# Patient Record
Sex: FEMALE | Race: ASIAN | NOT HISPANIC OR LATINO
[De-identification: names, ages, dates, MRNs, and addresses within clinical notes are randomized per-mention and may not be internally consistent; named-entity substitution may affect disease eponyms.]

---

## 2023-01-09 PROBLEM — Z00.00 ENCOUNTER FOR PREVENTIVE HEALTH EXAMINATION: Status: ACTIVE | Noted: 2023-01-09

## 2023-01-09 PROBLEM — Z86.39 HISTORY OF HYPERLIPIDEMIA: Status: RESOLVED | Noted: 2023-01-09 | Resolved: 2023-01-09

## 2023-01-09 PROBLEM — M81.0 AGE-RELATED OSTEOPOROSIS WITHOUT CURRENT PATHOLOGICAL FRACTURE: Status: RESOLVED | Noted: 2023-01-09 | Resolved: 2023-01-09

## 2023-01-09 PROBLEM — B18.1: Status: RESOLVED | Noted: 2023-01-09 | Resolved: 2023-01-09

## 2023-01-09 PROBLEM — U07.1 COVID-19: Status: RESOLVED | Noted: 2023-01-09 | Resolved: 2023-01-09

## 2023-01-09 PROBLEM — I71.23 ANEURYSM OF DESCENDING THORACIC AORTA WITHOUT RUPTURE: Status: RESOLVED | Noted: 2023-01-09 | Resolved: 2023-01-09

## 2023-01-09 PROBLEM — F51.01 PRIMARY INSOMNIA: Status: RESOLVED | Noted: 2023-01-09 | Resolved: 2023-01-09

## 2023-01-09 PROBLEM — I10 HYPERTENSION, BENIGN: Status: RESOLVED | Noted: 2023-01-09 | Resolved: 2023-01-09

## 2023-01-09 RX ORDER — TENOFOVIR ALAFENAMIDE 25 MG/1
25 TABLET ORAL
Refills: 0 | Status: ACTIVE | COMMUNITY

## 2023-01-09 RX ORDER — ASPIRIN 81 MG
81 TABLET, DELAYED RELEASE (ENTERIC COATED) ORAL
Refills: 0 | Status: ACTIVE | COMMUNITY

## 2023-01-09 RX ORDER — AMLODIPINE BESYLATE 5 MG/1
5 TABLET ORAL
Refills: 0 | Status: ACTIVE | COMMUNITY

## 2023-01-09 RX ORDER — ESOMEPRAZOLE MAGNESIUM 40 MG/1
40 CAPSULE, DELAYED RELEASE ORAL
Refills: 0 | Status: ACTIVE | COMMUNITY

## 2023-01-09 RX ORDER — ZOLPIDEM TARTRATE 10 MG/1
10 TABLET ORAL
Refills: 0 | Status: ACTIVE | COMMUNITY

## 2023-01-09 RX ORDER — ATORVASTATIN CALCIUM 20 MG/1
20 TABLET, FILM COATED ORAL
Refills: 0 | Status: ACTIVE | COMMUNITY

## 2023-01-09 RX ORDER — DENOSUMAB 60 MG/ML
60 INJECTION SUBCUTANEOUS
Refills: 0 | Status: ACTIVE | COMMUNITY

## 2023-01-13 ENCOUNTER — APPOINTMENT (OUTPATIENT)
Dept: THORACIC SURGERY | Facility: CLINIC | Age: 73
End: 2023-01-13
Payer: MEDICARE

## 2023-01-13 ENCOUNTER — OUTPATIENT (OUTPATIENT)
Dept: OUTPATIENT SERVICES | Facility: HOSPITAL | Age: 73
LOS: 1 days | End: 2023-01-13
Payer: MEDICARE

## 2023-01-13 VITALS
HEART RATE: 71 BPM | DIASTOLIC BLOOD PRESSURE: 78 MMHG | TEMPERATURE: 97.4 F | OXYGEN SATURATION: 97 % | SYSTOLIC BLOOD PRESSURE: 149 MMHG | BODY MASS INDEX: 20.91 KG/M2 | HEIGHT: 63 IN | RESPIRATION RATE: 18 BRPM | WEIGHT: 118 LBS

## 2023-01-13 DIAGNOSIS — R91.1 SOLITARY PULMONARY NODULE: ICD-10-CM

## 2023-01-13 DIAGNOSIS — I71.23 ANEURYSM OF THE DESCENDING THORACIC AORTA, WITHOUT RUPTURE: ICD-10-CM

## 2023-01-13 DIAGNOSIS — U07.1 COVID-19: ICD-10-CM

## 2023-01-13 DIAGNOSIS — Z86.39 PERSONAL HISTORY OF OTHER ENDOCRINE, NUTRITIONAL AND METABOLIC DISEASE: ICD-10-CM

## 2023-01-13 DIAGNOSIS — M81.0 AGE-RELATED OSTEOPOROSIS W/OUT CURRENT PATHOLOGICAL FRACTURE: ICD-10-CM

## 2023-01-13 DIAGNOSIS — Z01.818 ENCOUNTER FOR OTHER PREPROCEDURAL EXAMINATION: ICD-10-CM

## 2023-01-13 DIAGNOSIS — H91.90 UNSPECIFIED HEARING LOSS, UNSPECIFIED EAR: ICD-10-CM

## 2023-01-13 DIAGNOSIS — B18.1 CHRONIC VIRAL HEPATITIS B W/OUT DELTA-AGENT: ICD-10-CM

## 2023-01-13 DIAGNOSIS — I10 ESSENTIAL (PRIMARY) HYPERTENSION: ICD-10-CM

## 2023-01-13 DIAGNOSIS — Z86.59 PERSONAL HISTORY OF OTHER MENTAL AND BEHAVIORAL DISORDERS: ICD-10-CM

## 2023-01-13 DIAGNOSIS — F51.01 PRIMARY INSOMNIA: ICD-10-CM

## 2023-01-13 DIAGNOSIS — K74.60 CHRONIC VIRAL HEPATITIS B W/OUT DELTA-AGENT: ICD-10-CM

## 2023-01-13 LAB
ALBUMIN SERPL ELPH-MCNC: 4.8 G/DL — SIGNIFICANT CHANGE UP (ref 3.3–5)
ALP SERPL-CCNC: 62 U/L — SIGNIFICANT CHANGE UP (ref 40–120)
ALT FLD-CCNC: 37 U/L — SIGNIFICANT CHANGE UP (ref 10–45)
ANION GAP SERPL CALC-SCNC: 11 MMOL/L — SIGNIFICANT CHANGE UP (ref 5–17)
APPEARANCE UR: CLEAR — SIGNIFICANT CHANGE UP
APTT BLD: 28.6 SEC — SIGNIFICANT CHANGE UP (ref 27.5–35.5)
AST SERPL-CCNC: 37 U/L — SIGNIFICANT CHANGE UP (ref 10–40)
BACTERIA # UR AUTO: SIGNIFICANT CHANGE UP /HPF
BASOPHILS # BLD AUTO: 0.04 K/UL — SIGNIFICANT CHANGE UP (ref 0–0.2)
BASOPHILS NFR BLD AUTO: 0.6 % — SIGNIFICANT CHANGE UP (ref 0–2)
BILIRUB SERPL-MCNC: 0.8 MG/DL — SIGNIFICANT CHANGE UP (ref 0.2–1.2)
BILIRUB UR-MCNC: NEGATIVE — SIGNIFICANT CHANGE UP
BUN SERPL-MCNC: 14 MG/DL — SIGNIFICANT CHANGE UP (ref 7–23)
CALCIUM SERPL-MCNC: 9.3 MG/DL — SIGNIFICANT CHANGE UP (ref 8.4–10.5)
CHLORIDE SERPL-SCNC: 101 MMOL/L — SIGNIFICANT CHANGE UP (ref 96–108)
CO2 SERPL-SCNC: 31 MMOL/L — SIGNIFICANT CHANGE UP (ref 22–31)
COLOR SPEC: YELLOW — SIGNIFICANT CHANGE UP
CREAT SERPL-MCNC: 0.64 MG/DL — SIGNIFICANT CHANGE UP (ref 0.5–1.3)
DIFF PNL FLD: ABNORMAL
EGFR: 94 ML/MIN/1.73M2 — SIGNIFICANT CHANGE UP
EOSINOPHIL # BLD AUTO: 0.03 K/UL — SIGNIFICANT CHANGE UP (ref 0–0.5)
EOSINOPHIL NFR BLD AUTO: 0.4 % — SIGNIFICANT CHANGE UP (ref 0–6)
EPI CELLS # UR: SIGNIFICANT CHANGE UP /HPF (ref 0–5)
GLUCOSE SERPL-MCNC: 107 MG/DL — HIGH (ref 70–99)
GLUCOSE UR QL: NEGATIVE — SIGNIFICANT CHANGE UP
HCT VFR BLD CALC: 43.1 % — SIGNIFICANT CHANGE UP (ref 34.5–45)
HGB BLD-MCNC: 14.4 G/DL — SIGNIFICANT CHANGE UP (ref 11.5–15.5)
IMM GRANULOCYTES NFR BLD AUTO: 0.3 % — SIGNIFICANT CHANGE UP (ref 0–0.9)
INR BLD: 0.93 — SIGNIFICANT CHANGE UP (ref 0.88–1.16)
KETONES UR-MCNC: NEGATIVE — SIGNIFICANT CHANGE UP
LEUKOCYTE ESTERASE UR-ACNC: NEGATIVE — SIGNIFICANT CHANGE UP
LYMPHOCYTES # BLD AUTO: 3.06 K/UL — SIGNIFICANT CHANGE UP (ref 1–3.3)
LYMPHOCYTES # BLD AUTO: 43.3 % — SIGNIFICANT CHANGE UP (ref 13–44)
MCHC RBC-ENTMCNC: 31.6 PG — SIGNIFICANT CHANGE UP (ref 27–34)
MCHC RBC-ENTMCNC: 33.4 GM/DL — SIGNIFICANT CHANGE UP (ref 32–36)
MCV RBC AUTO: 94.7 FL — SIGNIFICANT CHANGE UP (ref 80–100)
MONOCYTES # BLD AUTO: 0.59 K/UL — SIGNIFICANT CHANGE UP (ref 0–0.9)
MONOCYTES NFR BLD AUTO: 8.4 % — SIGNIFICANT CHANGE UP (ref 2–14)
NEUTROPHILS # BLD AUTO: 3.32 K/UL — SIGNIFICANT CHANGE UP (ref 1.8–7.4)
NEUTROPHILS NFR BLD AUTO: 47 % — SIGNIFICANT CHANGE UP (ref 43–77)
NITRITE UR-MCNC: NEGATIVE — SIGNIFICANT CHANGE UP
NRBC # BLD: 0 /100 WBCS — SIGNIFICANT CHANGE UP (ref 0–0)
PH UR: 7 — SIGNIFICANT CHANGE UP (ref 5–8)
PLATELET # BLD AUTO: 208 K/UL — SIGNIFICANT CHANGE UP (ref 150–400)
POTASSIUM SERPL-MCNC: 3.4 MMOL/L — LOW (ref 3.5–5.3)
POTASSIUM SERPL-SCNC: 3.4 MMOL/L — LOW (ref 3.5–5.3)
PROT SERPL-MCNC: 8.2 G/DL — SIGNIFICANT CHANGE UP (ref 6–8.3)
PROT UR-MCNC: NEGATIVE MG/DL — SIGNIFICANT CHANGE UP
PROTHROM AB SERPL-ACNC: 11 SEC — SIGNIFICANT CHANGE UP (ref 10.5–13.4)
RBC # BLD: 4.55 M/UL — SIGNIFICANT CHANGE UP (ref 3.8–5.2)
RBC # FLD: 12.9 % — SIGNIFICANT CHANGE UP (ref 10.3–14.5)
RBC CASTS # UR COMP ASSIST: < 5 /HPF — SIGNIFICANT CHANGE UP
SODIUM SERPL-SCNC: 143 MMOL/L — SIGNIFICANT CHANGE UP (ref 135–145)
SP GR SPEC: 1.01 — SIGNIFICANT CHANGE UP (ref 1–1.03)
UROBILINOGEN FLD QL: 0.2 E.U./DL — SIGNIFICANT CHANGE UP
WBC # BLD: 7.06 K/UL — SIGNIFICANT CHANGE UP (ref 3.8–10.5)
WBC # FLD AUTO: 7.06 K/UL — SIGNIFICANT CHANGE UP (ref 3.8–10.5)
WBC UR QL: < 5 /HPF — SIGNIFICANT CHANGE UP

## 2023-01-13 PROCEDURE — 85025 COMPLETE CBC W/AUTO DIFF WBC: CPT

## 2023-01-13 PROCEDURE — 85610 PROTHROMBIN TIME: CPT

## 2023-01-13 PROCEDURE — 85730 THROMBOPLASTIN TIME PARTIAL: CPT

## 2023-01-13 PROCEDURE — 36415 COLL VENOUS BLD VENIPUNCTURE: CPT

## 2023-01-13 PROCEDURE — 80053 COMPREHEN METABOLIC PANEL: CPT

## 2023-01-13 PROCEDURE — 86850 RBC ANTIBODY SCREEN: CPT

## 2023-01-13 PROCEDURE — 86901 BLOOD TYPING SEROLOGIC RH(D): CPT

## 2023-01-13 PROCEDURE — 99205 OFFICE O/P NEW HI 60 MIN: CPT

## 2023-01-13 PROCEDURE — 81001 URINALYSIS AUTO W/SCOPE: CPT

## 2023-01-13 PROCEDURE — 86900 BLOOD TYPING SEROLOGIC ABO: CPT

## 2023-01-18 PROBLEM — Z86.59 HISTORY OF DEPRESSION: Status: RESOLVED | Noted: 2023-01-18 | Resolved: 2023-01-18

## 2023-01-18 PROBLEM — Z01.818 PREOP EXAMINATION: Status: ACTIVE | Noted: 2023-01-09

## 2023-01-18 PROBLEM — R91.1 PULMONARY NODULE: Status: ACTIVE | Noted: 2023-01-09

## 2023-01-18 PROBLEM — H91.90 HARD OF HEARING: Status: ACTIVE | Noted: 2023-01-18

## 2023-01-18 NOTE — PHYSICAL EXAM
[General Appearance - Alert] : alert [General Appearance - In No Acute Distress] : in no acute distress [Neck Appearance] : the appearance of the neck was normal [Neck Cervical Mass (___cm)] : no neck mass was observed [Jugular Venous Distention Increased] : there was no jugular-venous distention [Thyroid Diffuse Enlargement] : the thyroid was not enlarged [Thyroid Nodule] : there were no palpable thyroid nodules [Auscultation Breath Sounds / Voice Sounds] : lungs were clear to auscultation bilaterally [Heart Rate And Rhythm] : heart rate was normal and rhythm regular [Heart Sounds] : normal S1 and S2 [Heart Sounds Gallop] : no gallops [Murmurs] : no murmurs [Heart Sounds Pericardial Friction Rub] : no pericardial rub [Abnormal Walk] : normal gait [Nail Clubbing] : no clubbing  or cyanosis of the fingernails [Musculoskeletal - Swelling] : no joint swelling seen [Motor Tone] : muscle strength and tone were normal [Skin Color & Pigmentation] : normal skin color and pigmentation [Skin Turgor] : normal skin turgor [] : no rash [Deep Tendon Reflexes (DTR)] : deep tendon reflexes were 2+ and symmetric [Sensation] : the sensory exam was normal to light touch and pinprick [No Focal Deficits] : no focal deficits [Impaired Insight] : insight and judgment were intact

## 2023-01-24 ENCOUNTER — OUTPATIENT (OUTPATIENT)
Dept: OUTPATIENT SERVICES | Facility: HOSPITAL | Age: 73
LOS: 1 days | End: 2023-01-24
Payer: MEDICARE

## 2023-01-24 DIAGNOSIS — R91.1 SOLITARY PULMONARY NODULE: ICD-10-CM

## 2023-01-24 DIAGNOSIS — Z01.818 ENCOUNTER FOR OTHER PREPROCEDURAL EXAMINATION: ICD-10-CM

## 2023-01-24 PROCEDURE — 94727 GAS DIL/WSHOT DETER LNG VOL: CPT | Mod: 26

## 2023-01-24 PROCEDURE — 94729 DIFFUSING CAPACITY: CPT

## 2023-01-24 PROCEDURE — 94010 BREATHING CAPACITY TEST: CPT | Mod: 26

## 2023-01-24 PROCEDURE — 94060 EVALUATION OF WHEEZING: CPT

## 2023-01-24 PROCEDURE — 94726 PLETHYSMOGRAPHY LUNG VOLUMES: CPT

## 2023-01-24 PROCEDURE — 94760 N-INVAS EAR/PLS OXIMETRY 1: CPT

## 2023-01-24 PROCEDURE — 94729 DIFFUSING CAPACITY: CPT | Mod: 26

## 2023-01-25 NOTE — H&P ADULT - NSHPLABSRESULTS_GEN_ALL_CORE
PFT on 01/24/23  FEV1 = 1.65, 85% of predicted value, FVC = 2.10, 89% of predicted value, PEF = 5.26, 95% of predicted value and DLCO = 16.93, 90% of predicted value.    PET on 01/18/23  - no significant FDG uptake a/w a persistent RLL subsolid, largely ground-glass area.   - No FDG-avid diseae in the field of view.  - Scattered ascending colonic diverticulosis    CT chest on 01/04/23  - Right lower lobe (14 mm) subsolid nodule. Adenocarcinoma spectrum lesions should be considered.   - Right lower lobe 3 mm pleural-based nodule Stable.  - Right upper lobe 2 mm mucous plugging   - Attended descending thoracic aorta (4.1 cm); unchanged.    CT chest on 07/18/22  - Mild biapical pleuroparenchymal scarring.   -1.4 x 0.9 cm peripheral right lower lobe subsolid, largely ground-glass opacity.   - 0.3 cm subpleural right lower lobe nodule No pulmonary consolidation or mass.

## 2023-01-25 NOTE — H&P ADULT - ASSESSMENT
IVELISSE MELGOZA is a 72 year old F, Cantonese speaking, 2nd hand smoker, with PMHx of HTN, HLD, HepB, Osteoporosis, depression, who is referred by Cherie Israel for an initial evaluation of a 1.4cm sub-solid nodule in right lower lobe CT chest.     Patient reports progressively worse shortness of breath since her COVID infection in Feb 2021. She otherwise denies unintentional weight loss, fatigue, headache, anorexia, chest pain, or hemoptysis.     CT chest on 01/04/23 and 07/18/2022 showed a persist 1.4 cm sub-solid nodule in Right lower lobe, which is not hypermetabolic active on following PET scan.  Adenocarcinoma spectrum lesions should be considered. Pt deems a surgical candidate.      I suggest surgical resection of the nodule for diagnosis and treatment. The patient was counseled on the risks, benefits and alternatives of proceeding with RVATS, RA, RLL wedge resection, possible lobectomy and MLND. Specifically, the risk of bleeding, need for a blood transfusion, DVT, pulmonary embolism, pneumonia, postoperative respiratory insufficiency, postoperative ventilator support, as well as prolonged air leak, need for chest drainage, dysrhythmia, myocardial infarction, postoperative pain syndrome, need for adjuvant therapy, risk of recurrence, need for surveillance, conversion to an open procedure, as well as mortality was discussed with the patient who understands and agrees to the above.

## 2023-01-25 NOTE — H&P ADULT - HISTORY OF PRESENT ILLNESS
IVELISSE MELGOZA is a 72 year old F, Cantonese speaking, 2nd hand smoker, with PMHx of HTN, HLD, HepB, Osteoporosis, depression, who is referred by Cherie Israel for an initial evaluation of a 1.4cm sub-solid nodule in right lower lobe CT chest.     Patient reports progressively worse shortness of breath since her COVID infection in Feb 2021. She otherwise denies unintentional weight loss, fatigue, headache, anorexia, chest pain, or hemoptysis.    IVELISSE MELGOZA is a 72 year old F, Cantonese speaking, 2nd hand smoker, with PMHx of HTN, HLD, HepB, Osteoporosis, depression, who is referred by Cherie Israel for an initial evaluation of a 1.4cm sub-solid nodule in right lower lobe CT chest.     Patient reports progressively worse shortness of breath since her COVID infection in Feb 2021. She otherwise denies unintentional weight loss, fatigue, headache, anorexia, chest pain, or hemoptysis.     Patient seen in same day holding area; Reports no changes to PMHx or medications since last seen by our team. Denies acute or current SOB, chest pain, palpitation, N/V/D, fever/chills, recent illness, or any other concerning symptoms.  Admit under Dr. Narvaez via same day surgery. Consent signed, placed on chart.  Risks/benefits reviewed, patient understands and agrees. T&S ordered and blood products placed on hold for OR.  To 9  post-op.

## 2023-01-25 NOTE — H&P ADULT - NSICDXPASTMEDICALHX_GEN_ALL_CORE_FT
PAST MEDICAL HISTORY:  Anxiety with depression     COVID-19 virus infection     Hepatitis B infection     Hyperlipidemia     Hypertension     Osteoporosis

## 2023-01-25 NOTE — H&P ADULT - NSHPPHYSICALEXAM_GEN_ALL_CORE
weight = 118lbs on 01/13/23    Constitutional: alert and in no acute distress.   Neck: the appearance of the neck was normal, no neck mass was observed, the thyroid was not enlarged and there were no palpable thyroid nodules . there was no jugular-venous distention.   Pulmonary: no respiratory distress and lungs were clear to auscultation bilaterally.   Heart: heart rate was normal and rhythm regular, normal S1 and S2, no gallops, no murmurs and no pericardial rub.   Musculoskeletal: normal gait, no clubbing or cyanosis of the fingernails, no joint swelling seen and muscle strength and tone were normal.   Skin: normal skin color and pigmentation, normal skin turgor and no rash.   Neurological: deep tendon reflexes were 2+ and symmetric, the sensory exam was normal to light touch and pinprick and no focal deficits.   Psychiatric: insight and judgment were intact.

## 2023-01-25 NOTE — H&P ADULT - NSHPREVIEWOFSYSTEMS_GEN_ALL_CORE
ENT:. heard of hearing bilateral.   Respiratory: as noted in HPI.   Psychiatric: depression . insomnia.   Constitutional, Eyes, Cardiovascular, Gastrointestinal, Musculoskeletal, Integumentary, Neurological and Endocrine are otherwise negative.

## 2023-01-26 ENCOUNTER — TRANSCRIPTION ENCOUNTER (OUTPATIENT)
Age: 73
End: 2023-01-26

## 2023-01-26 VITALS
HEIGHT: 63 IN | RESPIRATION RATE: 16 BRPM | HEART RATE: 73 BPM | TEMPERATURE: 97 F | DIASTOLIC BLOOD PRESSURE: 85 MMHG | WEIGHT: 114.64 LBS | SYSTOLIC BLOOD PRESSURE: 148 MMHG | OXYGEN SATURATION: 100 %

## 2023-01-26 RX ORDER — ASPIRIN/CALCIUM CARB/MAGNESIUM 324 MG
1 TABLET ORAL
Qty: 0 | Refills: 0 | DISCHARGE

## 2023-01-26 NOTE — PATIENT PROFILE ADULT - FALL HARM RISK - UNIVERSAL INTERVENTIONS
Bed in lowest position, wheels locked, appropriate side rails in place/Call bell, personal items and telephone in reach/Instruct patient to call for assistance before getting out of bed or chair/Non-slip footwear when patient is out of bed/[place] to call system/Physically safe environment - no spills, clutter or unnecessary equipment/Purposeful Proactive Rounding/Room/bathroom lighting operational, light cord in reach

## 2023-01-27 ENCOUNTER — NON-APPOINTMENT (OUTPATIENT)
Age: 73
End: 2023-01-27

## 2023-01-27 ENCOUNTER — RESULT REVIEW (OUTPATIENT)
Age: 73
End: 2023-01-27

## 2023-01-27 ENCOUNTER — TRANSCRIPTION ENCOUNTER (OUTPATIENT)
Age: 73
End: 2023-01-27

## 2023-01-27 ENCOUNTER — INPATIENT (INPATIENT)
Facility: HOSPITAL | Age: 73
LOS: 1 days | Discharge: HOME | DRG: 164 | End: 2023-01-29
Attending: THORACIC SURGERY (CARDIOTHORACIC VASCULAR SURGERY) | Admitting: THORACIC SURGERY (CARDIOTHORACIC VASCULAR SURGERY)
Payer: MEDICARE

## 2023-01-27 ENCOUNTER — APPOINTMENT (OUTPATIENT)
Dept: THORACIC SURGERY | Facility: HOSPITAL | Age: 73
End: 2023-01-27

## 2023-01-27 DIAGNOSIS — I10 ESSENTIAL (PRIMARY) HYPERTENSION: ICD-10-CM

## 2023-01-27 DIAGNOSIS — R91.1 SOLITARY PULMONARY NODULE: ICD-10-CM
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
## 2023-01-27 DIAGNOSIS — B19.10 UNSPECIFIED VIRAL HEPATITIS B WITHOUT HEPATIC COMA: ICD-10-CM

## 2023-01-27 DIAGNOSIS — H91.93 UNSPECIFIED HEARING LOSS, BILATERAL: ICD-10-CM

## 2023-01-27 DIAGNOSIS — Z79.620 LONG TERM (CURRENT) USE OF IMMUNOSUPPRESSIVE BIOLOGIC: ICD-10-CM
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
## 2023-01-27 DIAGNOSIS — M81.0 AGE-RELATED OSTEOPOROSIS WITHOUT CURRENT PATHOLOGICAL FRACTURE: ICD-10-CM

## 2023-01-27 DIAGNOSIS — Z79.899 OTHER LONG TERM (CURRENT) DRUG THERAPY: ICD-10-CM

## 2023-01-27 DIAGNOSIS — C34.31 MALIGNANT NEOPLASM OF LOWER LOBE, RIGHT BRONCHUS OR LUNG: ICD-10-CM

## 2023-01-27 DIAGNOSIS — H26.9 UNSPECIFIED CATARACT: Chronic | ICD-10-CM

## 2023-01-27 DIAGNOSIS — Z77.22 CONTACT WITH AND (SUSPECTED) EXPOSURE TO ENVIRONMENTAL TOBACCO SMOKE (ACUTE) (CHRONIC): ICD-10-CM

## 2023-01-27 DIAGNOSIS — F41.8 OTHER SPECIFIED ANXIETY DISORDERS: ICD-10-CM

## 2023-01-27 DIAGNOSIS — J95.811 POSTPROCEDURAL PNEUMOTHORAX: ICD-10-CM

## 2023-01-27 DIAGNOSIS — Z86.16 PERSONAL HISTORY OF COVID-19: ICD-10-CM

## 2023-01-27 DIAGNOSIS — Z90.721 ACQUIRED ABSENCE OF OVARIES, UNILATERAL: Chronic | ICD-10-CM

## 2023-01-27 DIAGNOSIS — E78.5 HYPERLIPIDEMIA, UNSPECIFIED: ICD-10-CM

## 2023-01-27 LAB
ANION GAP SERPL CALC-SCNC: 13 MMOL/L — SIGNIFICANT CHANGE UP (ref 5–17)
BASOPHILS # BLD AUTO: 0.04 K/UL — SIGNIFICANT CHANGE UP (ref 0–0.2)
BASOPHILS NFR BLD AUTO: 0.5 % — SIGNIFICANT CHANGE UP (ref 0–2)
BLD GP AB SCN SERPL QL: NEGATIVE — SIGNIFICANT CHANGE UP
BUN SERPL-MCNC: 11 MG/DL — SIGNIFICANT CHANGE UP (ref 7–23)
CALCIUM SERPL-MCNC: 9.1 MG/DL — SIGNIFICANT CHANGE UP (ref 8.4–10.5)
CHLORIDE SERPL-SCNC: 103 MMOL/L — SIGNIFICANT CHANGE UP (ref 96–108)
CO2 SERPL-SCNC: 28 MMOL/L — SIGNIFICANT CHANGE UP (ref 22–31)
CREAT SERPL-MCNC: 0.61 MG/DL — SIGNIFICANT CHANGE UP (ref 0.5–1.3)
EGFR: 96 ML/MIN/1.73M2 — SIGNIFICANT CHANGE UP
EOSINOPHIL # BLD AUTO: 0.04 K/UL — SIGNIFICANT CHANGE UP (ref 0–0.5)
EOSINOPHIL NFR BLD AUTO: 0.5 % — SIGNIFICANT CHANGE UP (ref 0–6)
GLUCOSE SERPL-MCNC: 149 MG/DL — HIGH (ref 70–99)
GRAM STN FLD: SIGNIFICANT CHANGE UP
HCT VFR BLD CALC: 42.3 % — SIGNIFICANT CHANGE UP (ref 34.5–45)
HGB BLD-MCNC: 14 G/DL — SIGNIFICANT CHANGE UP (ref 11.5–15.5)
IMM GRANULOCYTES NFR BLD AUTO: 0.4 % — SIGNIFICANT CHANGE UP (ref 0–0.9)
LYMPHOCYTES # BLD AUTO: 1.51 K/UL — SIGNIFICANT CHANGE UP (ref 1–3.3)
LYMPHOCYTES # BLD AUTO: 20.1 % — SIGNIFICANT CHANGE UP (ref 13–44)
MCHC RBC-ENTMCNC: 31.5 PG — SIGNIFICANT CHANGE UP (ref 27–34)
MCHC RBC-ENTMCNC: 33.1 GM/DL — SIGNIFICANT CHANGE UP (ref 32–36)
MCV RBC AUTO: 95.1 FL — SIGNIFICANT CHANGE UP (ref 80–100)
MONOCYTES # BLD AUTO: 0.16 K/UL — SIGNIFICANT CHANGE UP (ref 0–0.9)
MONOCYTES NFR BLD AUTO: 2.1 % — SIGNIFICANT CHANGE UP (ref 2–14)
NEUTROPHILS # BLD AUTO: 5.75 K/UL — SIGNIFICANT CHANGE UP (ref 1.8–7.4)
NEUTROPHILS NFR BLD AUTO: 76.4 % — SIGNIFICANT CHANGE UP (ref 43–77)
NRBC # BLD: 0 /100 WBCS — SIGNIFICANT CHANGE UP (ref 0–0)
PLATELET # BLD AUTO: 171 K/UL — SIGNIFICANT CHANGE UP (ref 150–400)
POTASSIUM SERPL-MCNC: 3.5 MMOL/L — SIGNIFICANT CHANGE UP (ref 3.5–5.3)
POTASSIUM SERPL-SCNC: 3.5 MMOL/L — SIGNIFICANT CHANGE UP (ref 3.5–5.3)
RBC # BLD: 4.45 M/UL — SIGNIFICANT CHANGE UP (ref 3.8–5.2)
RBC # FLD: 12.7 % — SIGNIFICANT CHANGE UP (ref 10.3–14.5)
RH IG SCN BLD-IMP: POSITIVE — SIGNIFICANT CHANGE UP
SODIUM SERPL-SCNC: 144 MMOL/L — SIGNIFICANT CHANGE UP (ref 135–145)
SPECIMEN SOURCE: SIGNIFICANT CHANGE UP
WBC # BLD: 7.53 K/UL — SIGNIFICANT CHANGE UP (ref 3.8–10.5)
WBC # FLD AUTO: 7.53 K/UL — SIGNIFICANT CHANGE UP (ref 3.8–10.5)

## 2023-01-27 PROCEDURE — 88307 TISSUE EXAM BY PATHOLOGIST: CPT | Mod: 26

## 2023-01-27 PROCEDURE — 31622 DX BRONCHOSCOPE/WASH: CPT

## 2023-01-27 PROCEDURE — 71045 X-RAY EXAM CHEST 1 VIEW: CPT | Mod: 26

## 2023-01-27 PROCEDURE — 32674 THORACOSCOPY LYMPH NODE EXC: CPT

## 2023-01-27 PROCEDURE — 32666 THORACOSCOPY W/WEDGE RESECT: CPT

## 2023-01-27 PROCEDURE — S2900 ROBOTIC SURGICAL SYSTEM: CPT | Mod: NC

## 2023-01-27 PROCEDURE — 32667 THORACOSCOPY W/W RESECT ADDL: CPT

## 2023-01-27 PROCEDURE — 88331 PATH CONSLTJ SURG 1 BLK 1SPC: CPT | Mod: 26

## 2023-01-27 PROCEDURE — 88305 TISSUE EXAM BY PATHOLOGIST: CPT | Mod: 26

## 2023-01-27 PROCEDURE — 88313 SPECIAL STAINS GROUP 2: CPT | Mod: 26

## 2023-01-27 DEVICE — SURGICEL 4 X 8": Type: IMPLANTABLE DEVICE | Status: FUNCTIONAL

## 2023-01-27 DEVICE — STAPLER COVIDIEN TRI-STAPLE 45MM BLACK RELOAD: Type: IMPLANTABLE DEVICE | Status: FUNCTIONAL

## 2023-01-27 DEVICE — CHEST DRAIN THORACIC PVC 28FR STRAIGHT: Type: IMPLANTABLE DEVICE | Status: FUNCTIONAL

## 2023-01-27 DEVICE — STAPLER COVIDIEN TRI-STAPLE 45MM PURPLE RELOAD: Type: IMPLANTABLE DEVICE | Status: FUNCTIONAL

## 2023-01-27 DEVICE — LIGATING CLIPS WECK HORIZON SMALL-WIDE (RED) 6: Type: IMPLANTABLE DEVICE | Status: FUNCTIONAL

## 2023-01-27 DEVICE — LIGATING CLIPS WECK HEMOLOK POLYMER MEDIUM-LARGE (GREEN) 6: Type: IMPLANTABLE DEVICE | Status: FUNCTIONAL

## 2023-01-27 DEVICE — STAPLER COVIDIEN TRI-STAPLE 60MM BLACK RELOAD: Type: IMPLANTABLE DEVICE | Status: FUNCTIONAL

## 2023-01-27 DEVICE — STAPLER COVIDIEN TRI-STAPLE 60MM PURPLE RELOAD: Type: IMPLANTABLE DEVICE | Status: FUNCTIONAL

## 2023-01-27 RX ORDER — ACETAMINOPHEN 500 MG
650 TABLET ORAL ONCE
Refills: 0 | Status: COMPLETED | OUTPATIENT
Start: 2023-01-27 | End: 2023-01-27

## 2023-01-27 RX ORDER — ZOLPIDEM TARTRATE 10 MG/1
1 TABLET ORAL
Qty: 0 | Refills: 0 | DISCHARGE

## 2023-01-27 RX ORDER — AMLODIPINE BESYLATE 2.5 MG/1
1 TABLET ORAL
Qty: 0 | Refills: 0 | DISCHARGE

## 2023-01-27 RX ORDER — ACETAMINOPHEN 500 MG
1000 TABLET ORAL ONCE
Refills: 0 | Status: COMPLETED | OUTPATIENT
Start: 2023-01-27 | End: 2023-01-27

## 2023-01-27 RX ORDER — IPRATROPIUM BROMIDE 0.2 MG/ML
500 SOLUTION, NON-ORAL INHALATION EVERY 6 HOURS
Refills: 0 | Status: DISCONTINUED | OUTPATIENT
Start: 2023-01-27 | End: 2023-01-29

## 2023-01-27 RX ORDER — ACETAMINOPHEN 500 MG
975 TABLET ORAL EVERY 6 HOURS
Refills: 0 | Status: DISCONTINUED | OUTPATIENT
Start: 2023-01-27 | End: 2023-01-29

## 2023-01-27 RX ORDER — ATORVASTATIN CALCIUM 80 MG/1
1 TABLET, FILM COATED ORAL
Qty: 0 | Refills: 0 | DISCHARGE

## 2023-01-27 RX ORDER — ENOXAPARIN SODIUM 100 MG/ML
40 INJECTION SUBCUTANEOUS EVERY 24 HOURS
Refills: 0 | Status: DISCONTINUED | OUTPATIENT
Start: 2023-01-28 | End: 2023-01-29

## 2023-01-27 RX ORDER — TENOFOVIR DISOPROXIL FUMARATE 300 MG/1
1 TABLET, FILM COATED ORAL
Qty: 0 | Refills: 0 | DISCHARGE

## 2023-01-27 RX ORDER — TENOFOVIR DISOPROXIL FUMARATE 300 MG/1
25 TABLET, FILM COATED ORAL DAILY
Refills: 0 | Status: DISCONTINUED | OUTPATIENT
Start: 2023-01-27 | End: 2023-01-29

## 2023-01-27 RX ORDER — ATORVASTATIN CALCIUM 80 MG/1
20 TABLET, FILM COATED ORAL AT BEDTIME
Refills: 0 | Status: DISCONTINUED | OUTPATIENT
Start: 2023-01-27 | End: 2023-01-29

## 2023-01-27 RX ORDER — HEPARIN SODIUM 5000 [USP'U]/ML
5000 INJECTION INTRAVENOUS; SUBCUTANEOUS ONCE
Refills: 0 | Status: COMPLETED | OUTPATIENT
Start: 2023-01-27 | End: 2023-01-27

## 2023-01-27 RX ORDER — DENOSUMAB 60 MG/ML
60 INJECTION SUBCUTANEOUS
Qty: 0 | Refills: 0 | DISCHARGE

## 2023-01-27 RX ORDER — SODIUM CHLORIDE 9 MG/ML
1000 INJECTION, SOLUTION INTRAVENOUS
Refills: 0 | Status: DISCONTINUED | OUTPATIENT
Start: 2023-01-27 | End: 2023-01-29

## 2023-01-27 RX ORDER — PANTOPRAZOLE SODIUM 20 MG/1
40 TABLET, DELAYED RELEASE ORAL
Refills: 0 | Status: DISCONTINUED | OUTPATIENT
Start: 2023-01-27 | End: 2023-01-29

## 2023-01-27 RX ORDER — HYDROMORPHONE HYDROCHLORIDE 2 MG/ML
0.25 INJECTION INTRAMUSCULAR; INTRAVENOUS; SUBCUTANEOUS
Refills: 0 | Status: DISCONTINUED | OUTPATIENT
Start: 2023-01-27 | End: 2023-01-27

## 2023-01-27 RX ADMIN — ATORVASTATIN CALCIUM 20 MILLIGRAM(S): 80 TABLET, FILM COATED ORAL at 21:57

## 2023-01-27 RX ADMIN — HYDROMORPHONE HYDROCHLORIDE 0.25 MILLIGRAM(S): 2 INJECTION INTRAMUSCULAR; INTRAVENOUS; SUBCUTANEOUS at 18:47

## 2023-01-27 RX ADMIN — HYDROMORPHONE HYDROCHLORIDE 0.25 MILLIGRAM(S): 2 INJECTION INTRAMUSCULAR; INTRAVENOUS; SUBCUTANEOUS at 21:57

## 2023-01-27 RX ADMIN — HYDROMORPHONE HYDROCHLORIDE 0.25 MILLIGRAM(S): 2 INJECTION INTRAMUSCULAR; INTRAVENOUS; SUBCUTANEOUS at 20:07

## 2023-01-27 RX ADMIN — Medication 1000 MILLIGRAM(S): at 18:15

## 2023-01-27 RX ADMIN — Medication 400 MILLIGRAM(S): at 17:46

## 2023-01-27 RX ADMIN — Medication 650 MILLIGRAM(S): at 12:00

## 2023-01-27 RX ADMIN — HYDROMORPHONE HYDROCHLORIDE 0.25 MILLIGRAM(S): 2 INJECTION INTRAMUSCULAR; INTRAVENOUS; SUBCUTANEOUS at 18:34

## 2023-01-27 RX ADMIN — HEPARIN SODIUM 5000 UNIT(S): 5000 INJECTION INTRAVENOUS; SUBCUTANEOUS at 12:00

## 2023-01-27 RX ADMIN — Medication 500 MICROGRAM(S): at 18:39

## 2023-01-27 NOTE — BRIEF OPERATIVE NOTE - NSICDXBRIEFPROCEDURE_GEN_ALL_CORE_FT
PROCEDURES:  Wedge resection of right lung with video-assisted thoracoscopic surgery (VATS) 27-Jan-2023 17:04:42 RVATS, RA, RLL wedge resection, MLND Damari Nichols

## 2023-01-27 NOTE — BRIEF OPERATIVE NOTE - COMMENTS
Dr. Bejarano was the first assistant for this case including but not limited to dissection    I was present for this procedure and participated as first assistant as described by the PA above, unless otherwise noted below.

## 2023-01-28 LAB
ALBUMIN SERPL ELPH-MCNC: 4.3 G/DL — SIGNIFICANT CHANGE UP (ref 3.3–5)
ALP SERPL-CCNC: 44 U/L — SIGNIFICANT CHANGE UP (ref 40–120)
ALT FLD-CCNC: 20 U/L — SIGNIFICANT CHANGE UP (ref 10–45)
ANION GAP SERPL CALC-SCNC: 8 MMOL/L — SIGNIFICANT CHANGE UP (ref 5–17)
APTT BLD: 29.3 SEC — SIGNIFICANT CHANGE UP (ref 27.5–35.5)
AST SERPL-CCNC: 31 U/L — SIGNIFICANT CHANGE UP (ref 10–40)
BILIRUB SERPL-MCNC: 0.9 MG/DL — SIGNIFICANT CHANGE UP (ref 0.2–1.2)
BUN SERPL-MCNC: 9 MG/DL — SIGNIFICANT CHANGE UP (ref 7–23)
CALCIUM SERPL-MCNC: 8.6 MG/DL — SIGNIFICANT CHANGE UP (ref 8.4–10.5)
CHLORIDE SERPL-SCNC: 103 MMOL/L — SIGNIFICANT CHANGE UP (ref 96–108)
CO2 SERPL-SCNC: 30 MMOL/L — SIGNIFICANT CHANGE UP (ref 22–31)
CREAT SERPL-MCNC: 0.58 MG/DL — SIGNIFICANT CHANGE UP (ref 0.5–1.3)
EGFR: 97 ML/MIN/1.73M2 — SIGNIFICANT CHANGE UP
GLUCOSE SERPL-MCNC: 125 MG/DL — HIGH (ref 70–99)
HCT VFR BLD CALC: 39.5 % — SIGNIFICANT CHANGE UP (ref 34.5–45)
HGB BLD-MCNC: 12.9 G/DL — SIGNIFICANT CHANGE UP (ref 11.5–15.5)
INR BLD: 1.05 — SIGNIFICANT CHANGE UP (ref 0.88–1.16)
MAGNESIUM SERPL-MCNC: 2 MG/DL — SIGNIFICANT CHANGE UP (ref 1.6–2.6)
MCHC RBC-ENTMCNC: 31.8 PG — SIGNIFICANT CHANGE UP (ref 27–34)
MCHC RBC-ENTMCNC: 32.7 GM/DL — SIGNIFICANT CHANGE UP (ref 32–36)
MCV RBC AUTO: 97.3 FL — SIGNIFICANT CHANGE UP (ref 80–100)
NRBC # BLD: 0 /100 WBCS — SIGNIFICANT CHANGE UP (ref 0–0)
PHOSPHATE SERPL-MCNC: 3.2 MG/DL — SIGNIFICANT CHANGE UP (ref 2.5–4.5)
PLATELET # BLD AUTO: 171 K/UL — SIGNIFICANT CHANGE UP (ref 150–400)
POTASSIUM SERPL-MCNC: 5.1 MMOL/L — SIGNIFICANT CHANGE UP (ref 3.5–5.3)
POTASSIUM SERPL-SCNC: 5.1 MMOL/L — SIGNIFICANT CHANGE UP (ref 3.5–5.3)
PROT SERPL-MCNC: 7.4 G/DL — SIGNIFICANT CHANGE UP (ref 6–8.3)
PROTHROM AB SERPL-ACNC: 12.5 SEC — SIGNIFICANT CHANGE UP (ref 10.5–13.4)
RBC # BLD: 4.06 M/UL — SIGNIFICANT CHANGE UP (ref 3.8–5.2)
RBC # FLD: 12.5 % — SIGNIFICANT CHANGE UP (ref 10.3–14.5)
SODIUM SERPL-SCNC: 141 MMOL/L — SIGNIFICANT CHANGE UP (ref 135–145)
WBC # BLD: 8.17 K/UL — SIGNIFICANT CHANGE UP (ref 3.8–10.5)
WBC # FLD AUTO: 8.17 K/UL — SIGNIFICANT CHANGE UP (ref 3.8–10.5)

## 2023-01-28 PROCEDURE — 99221 1ST HOSP IP/OBS SF/LOW 40: CPT

## 2023-01-28 PROCEDURE — 71045 X-RAY EXAM CHEST 1 VIEW: CPT | Mod: 26

## 2023-01-28 RX ORDER — POTASSIUM CHLORIDE 20 MEQ
20 PACKET (EA) ORAL
Refills: 0 | Status: COMPLETED | OUTPATIENT
Start: 2023-01-28 | End: 2023-01-28

## 2023-01-28 RX ORDER — OXYCODONE HYDROCHLORIDE 5 MG/1
5 TABLET ORAL EVERY 6 HOURS
Refills: 0 | Status: DISCONTINUED | OUTPATIENT
Start: 2023-01-28 | End: 2023-01-29

## 2023-01-28 RX ORDER — CHLORHEXIDINE GLUCONATE 213 G/1000ML
1 SOLUTION TOPICAL
Refills: 0 | Status: DISCONTINUED | OUTPATIENT
Start: 2023-01-28 | End: 2023-01-28

## 2023-01-28 RX ORDER — IBUPROFEN 200 MG
400 TABLET ORAL EVERY 6 HOURS
Refills: 0 | Status: DISCONTINUED | OUTPATIENT
Start: 2023-01-28 | End: 2023-01-29

## 2023-01-28 RX ORDER — OXYCODONE HYDROCHLORIDE 5 MG/1
10 TABLET ORAL EVERY 6 HOURS
Refills: 0 | Status: DISCONTINUED | OUTPATIENT
Start: 2023-01-28 | End: 2023-01-29

## 2023-01-28 RX ORDER — AMLODIPINE BESYLATE 2.5 MG/1
5 TABLET ORAL DAILY
Refills: 0 | Status: DISCONTINUED | OUTPATIENT
Start: 2023-01-28 | End: 2023-01-29

## 2023-01-28 RX ADMIN — Medication 975 MILLIGRAM(S): at 07:00

## 2023-01-28 RX ADMIN — ENOXAPARIN SODIUM 40 MILLIGRAM(S): 100 INJECTION SUBCUTANEOUS at 17:39

## 2023-01-28 RX ADMIN — Medication 500 MICROGRAM(S): at 11:13

## 2023-01-28 RX ADMIN — Medication 975 MILLIGRAM(S): at 20:00

## 2023-01-28 RX ADMIN — PANTOPRAZOLE SODIUM 40 MILLIGRAM(S): 20 TABLET, DELAYED RELEASE ORAL at 06:17

## 2023-01-28 RX ADMIN — Medication 975 MILLIGRAM(S): at 00:06

## 2023-01-28 RX ADMIN — Medication 975 MILLIGRAM(S): at 11:12

## 2023-01-28 RX ADMIN — Medication 975 MILLIGRAM(S): at 01:00

## 2023-01-28 RX ADMIN — AMLODIPINE BESYLATE 5 MILLIGRAM(S): 2.5 TABLET ORAL at 16:26

## 2023-01-28 RX ADMIN — Medication 500 MICROGRAM(S): at 17:38

## 2023-01-28 RX ADMIN — Medication 20 MILLIEQUIVALENT(S): at 00:50

## 2023-01-28 RX ADMIN — Medication 20 MILLIEQUIVALENT(S): at 04:56

## 2023-01-28 RX ADMIN — Medication 975 MILLIGRAM(S): at 12:12

## 2023-01-28 RX ADMIN — Medication 975 MILLIGRAM(S): at 23:43

## 2023-01-28 RX ADMIN — TENOFOVIR DISOPROXIL FUMARATE 25 MILLIGRAM(S): 300 TABLET, FILM COATED ORAL at 11:13

## 2023-01-28 RX ADMIN — Medication 975 MILLIGRAM(S): at 06:16

## 2023-01-28 RX ADMIN — Medication 20 MILLIEQUIVALENT(S): at 02:08

## 2023-01-28 RX ADMIN — Medication 975 MILLIGRAM(S): at 17:38

## 2023-01-28 RX ADMIN — ATORVASTATIN CALCIUM 20 MILLIGRAM(S): 80 TABLET, FILM COATED ORAL at 21:14

## 2023-01-28 NOTE — CONSULT NOTE ADULT - ASSESSMENT
IVELISSE MELGOZA is a 72 year old F, Cantonese/ Mandarin speaking  2nd hand smoker, with PMHx of HTN, HLD, HepB on vemlidy, Osteoporosis, depression, pmd Cherie Israel refer patient to Dr. Ronaldo Narvaez for an initial evaluation of a 1.4cm sub-solid nodule in right lower lobe CT chest. Patient reports progressively worse shortness of breath since her COVID infection in Feb 2021. her  passed away last year, she went back to [employer] and reportedly loss some weight, subsequent imaging shows increase in size of nodule and arranged for surgery.   She is currently living with her sister and brother in law- her children lives out of state.    she had RVATs, RA, RLL wedge resection, MLND, RLL wedge positive for malignancy, more margin taken, pt was notified about the finding. [facility] to  [facility], chest tube removed this morning, she ambulated , reported pain at the surgical site and was given Tylenol  she is hesistant to go home today and requested team to stay another night.    - RLL malignant mass -sp RVATs, RA, RLL wedge resection, MLND, RLL wedge positive for malignancy, more margin taken 1/27/23  post op day # 1  - sp chest tube removal with small right apical pneumothorax, clinically saturating well, no sign of respiratory distress  - pain management - tylenol prn.  - incentive spirometry- witnessed using -encouraged 10 times per hour.  - oob to chair, ambulate- she need encouragment   - fu pathology result    - HTN=continue home meds as bP allows.  - HLD -cw statin  - Hep B - on vemlidy  - she could not sleep well last night -can give melatonin around 9 -10 pm- encouraged patient to notify RN, if pain/ insomnia.  thank you for allowing to participate in the care, manny RN, questions from patient and sister answered. reassurance provided, she need encouragement ( per sister she is having some sort of depression since her  passed away, no suicidal. pt verbalized she is getting support from her family sister and brother. her children lives out of state.   care by CTS/CTicu appreciated.

## 2023-01-28 NOTE — CHART NOTE - NSCHARTNOTEFT_GEN_A_CORE
Seen with Dr. Bejarano this AM. No air leak on waterseal. No pneumothorax on CXR. Right sided chest tube removed without incidence. Tie down secured in place and occlusive dressing applied. CXR with possible small apical ptx. Oxygenating well. Will continue to monitor

## 2023-01-28 NOTE — CONSULT NOTE ADULT - SUBJECTIVE AND OBJECTIVE BOX
HPI:  IVELISSE MELGOZA is a 72 year old F, Cantonese speaking, 2nd hand smoker, with PMHx of HTN, HLD, HepB, Osteoporosis, depression, who is referred by Cherie Israel for an initial evaluation of a 1.4cm sub-solid nodule in right lower lobe CT chest.     Patient reports progressively worse shortness of breath since her COVID infection in Feb 2021. She otherwise denies unintentional weight loss, fatigue, headache, anorexia, chest pain, or hemoptysis.     Patient seen in same day holding area; Reports no changes to PMHx or medications since last seen by our team. Denies acute or current SOB, chest pain, palpitation, N/V/D, fever/chills, recent illness, or any other concerning symptoms.  Admit under Dr. Narvaez via same day surgery. Consent signed, placed on chart.  Risks/benefits reviewed, patient understands and agrees. T&S ordered and blood products placed on hold for OR.  To 9  post-op.  RVATS, RA, RLL wedge resection, MLND  RLL wedge positive for malignancy, more margin taken     (25 Jan 2023 07:28)      PAST MEDICAL & SURGICAL HISTORY:  Hypertension      Hyperlipidemia      Hepatitis B infection      Osteoporosis      Anxiety with depression      COVID-19 virus infection      Lung nodule      H/O unilateral oophorectomy      Cataract  b/l        Home Medications:  atorvastatin 20 mg oral tablet: 1 tab(s) orally once a day (27 Jan 2023 12:25)  Norvasc 5 mg oral tablet: 1 tab(s) orally once a day (27 Jan 2023 11:48)  Prolia 60 mg/mL subcutaneous solution: 60 milligram(s) subcutaneous every 6 months (27 Jan 2023 11:48)  Vemlidy 25 mg oral tablet: 1 tab(s) orally once a day (27 Jan 2023 11:48)  zolpidem 10 mg oral tablet: 1 tab(s) orally once a day (at bedtime), As Needed (27 Jan 2023 12:25)    Allergies    No Known Allergies    Intolerances      FAMILY HISTORY:    Social History:  housewife, never smoker, 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    Diet, Regular (01-28-23 @ 11:21) [Active]      CURRENT MEDICATIONS:   acetaminophen     Tablet .. 975 milliGRAM(s) Oral every 6 hours  atorvastatin 20 milliGRAM(s) Oral at bedtime  chlorhexidine 2% Cloths 1 Application(s) Topical <User Schedule>  enoxaparin Injectable 40 milliGRAM(s) SubCutaneous every 24 hours  ibuprofen  Tablet. 400 milliGRAM(s) Oral every 6 hours PRN  ipratropium    for Nebulization 500 MICROGram(s) Nebulizer every 6 hours  lactated ringers. 1000 milliLiter(s) IV Continuous <Continuous>  oxyCODONE    IR 5 milliGRAM(s) Oral every 6 hours PRN  oxyCODONE    IR 10 milliGRAM(s) Oral every 6 hours PRN  pantoprazole    Tablet 40 milliGRAM(s) Oral before breakfast  tenofovir alafenamide (VEMLIDY) 25 milliGRAM(s) Oral daily      VITAL SIGNS, INS/OUTS (last 24 hours):  Vital Signs Last 24 Hrs  T(C): 36.6 (28 Jan 2023 09:35), Max: 36.8 (27 Jan 2023 16:59)  T(F): 97.9 (28 Jan 2023 09:35), Max: 98.2 (27 Jan 2023 16:59)  HR: 69 (28 Jan 2023 11:00) (69 - 96)  BP: 135/69 (28 Jan 2023 09:00) (114/66 - 171/74)  BP(mean): 79 (28 Jan 2023 09:00) (79 - 120)  RR: 18 (28 Jan 2023 09:00) (14 - 25)  SpO2: 99% (28 Jan 2023 11:00) (95% - 100%)    Parameters below as of 28 Jan 2023 09:00  Patient On (Oxygen Delivery Method): room air      I&O's Summary    27 Jan 2023 07:01  -  28 Jan 2023 07:00  --------------------------------------------------------  IN: 300 mL / OUT: 1250 mL / NET: -950 mL    28 Jan 2023 07:01  -  28 Jan 2023 11:45  --------------------------------------------------------  IN: 240 mL / OUT: 5 mL / NET: 235 mL        PHYSICAL EXAM:  General exam:   HEENT:   Neck: supple, trachea at midline  CVS : RRR, +S1/S2  Pulm:   Abd:  BS present, soft, nt, not distended.  Skin: warm and dry,  Ext:    Neuro: AOx3, no gross focal neurological deficits  [place] :     BASIC LABS:                        12.9   8.17  )-----------( 171      ( 28 Jan 2023 07:16 )             39.5     01-28    141  |  103  |  9   ----------------------------<  125<H>  5.1   |  30  |  0.58    Ca    8.6      28 Jan 2023 07:16  Phos  3.2     01-28  Mg     2.0     01-28    TPro  7.4  /  Alb  4.3  /  TBili  0.9  /  DBili  x   /  AST  31  /  ALT  20  /  AlkPhos  44  01-28    PT/INR - ( 28 Jan 2023 07:16 )   PT: 12.5 sec;   INR: 1.05          PTT - ( 28 Jan 2023 07:16 )  PTT:29.3 sec    CAPILLARY BLOOD GLUCOSE          OTHER LABS:        MICRODATA:    Culture - Surgical Swab (collected 27 Jan 2023 16:00)  Source: .Surgical Swab right lower lung wedge or spec  Gram Stain (27 Jan 2023 19:05):    No organisms seen    Rare WBC's  Preliminary Report (28 Jan 2023 10:36):    No growth to date        IMAGING:    EKG:     #Diet - Diet, Regular (01-28-23 @ 11:21) [Active]        #DVT PPx - enoxaparin Injectable: [Ordered as LOVENOX]  40 milliGRAM(s), SubCutaneous, every 24 hours  Administration Instructions: This is a High Alert Medication.  Provider's Contact #: [phone] (01-27-23 @ 17:01)   HPI: from chart and also from patient/ patient's sister who is at bedside  encounter time around 11 am.     IVELISSE MELGOZA is a 72 year old F, Cantonese/ Mandarin speaking  2nd hand smoker, with PMHx of HTN, HLD, HepB on vemlidy, Osteoporosis, depression, pmd Cherie Israel refer patient to Dr. Ronaldo Narvaez for an initial evaluation of a 1.4cm sub-solid nodule in right lower lobe CT chest. Patient reports progressively worse shortness of breath since her COVID infection in Feb 2021. her  passed away last year, she went back to [employer] and reportedly loss some weight, subsequent imaging shows increase in size of nodule and arranged for surgery.   She is currently living with her sister and brother in law- her children lives out of state.    she had RVATs, RA, RLL wedge resection, MLND, RLL wedge positive for malignancy, more margin taken, pt was notified about the finding. admitted to [facility], chest tube removed this morning, she ambulated , reported pain at the surgical site and was given Tylenol  she is hesistant to go home today and requested team to stay another night.       PAST MEDICAL & SURGICAL HISTORY:  Hypertension      Hyperlipidemia      Hepatitis B infection      Osteoporosis      Anxiety with depression      COVID-19 virus infection      Lung nodule      H/O unilateral oophorectomy      Cataract  b/l        Home Medications:  atorvastatin 20 mg oral tablet: 1 tab(s) orally once a day (27 Jan 2023 12:25)  Norvasc 5 mg oral tablet: 1 tab(s) orally once a day (27 Jan 2023 11:48)  Prolia 60 mg/mL subcutaneous solution: 60 milligram(s) subcutaneous every 6 months (27 Jan 2023 11:48)  Vemlidy 25 mg oral tablet: 1 tab(s) orally once a day (27 Jan 2023 11:48)  zolpidem 10 mg oral tablet: 1 tab(s) orally once a day (at bedtime), As Needed (27 Jan 2023 12:25)    Allergies    No Known Allergies    Intolerances      FAMILY HISTORY:    Social History:  housewife, never smoker, 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    Diet, Regular (01-28-23 @ 11:21) [Active]      CURRENT MEDICATIONS:   acetaminophen     Tablet .. 975 milliGRAM(s) Oral every 6 hours  atorvastatin 20 milliGRAM(s) Oral at bedtime  chlorhexidine 2% Cloths 1 Application(s) Topical <User Schedule>  enoxaparin Injectable 40 milliGRAM(s) SubCutaneous every 24 hours  ibuprofen  Tablet. 400 milliGRAM(s) Oral every 6 hours PRN  ipratropium    for Nebulization 500 MICROGram(s) Nebulizer every 6 hours  lactated ringers. 1000 milliLiter(s) IV Continuous <Continuous>  oxyCODONE    IR 5 milliGRAM(s) Oral every 6 hours PRN  oxyCODONE    IR 10 milliGRAM(s) Oral every 6 hours PRN  pantoprazole    Tablet 40 milliGRAM(s) Oral before breakfast  tenofovir alafenamide (VEMLIDY) 25 milliGRAM(s) Oral daily      VITAL SIGNS, INS/OUTS (last 24 hours):  Vital Signs Last 24 Hrs  T(C): 36.6 (28 Jan 2023 09:35), Max: 36.8 (27 Jan 2023 16:59)  T(F): 97.9 (28 Jan 2023 09:35), Max: 98.2 (27 Jan 2023 16:59)  HR: 69 (28 Jan 2023 11:00) (69 - 96)  BP: 135/69 (28 Jan 2023 09:00) (114/66 - 171/74)  BP(mean): 79 (28 Jan 2023 09:00) (79 - 120)  RR: 18 (28 Jan 2023 09:00) (14 - 25)  SpO2: 99% (28 Jan 2023 11:00) (95% - 100%)    Parameters below as of 28 Jan 2023 09:00  Patient On (Oxygen Delivery Method): room air      I&O's Summary    27 Jan 2023 07:01  -  28 Jan 2023 07:00  --------------------------------------------------------  IN: 300 mL / OUT: 1250 mL / NET: -950 mL    28 Jan 2023 07:01  -  28 Jan 2023 11:45  --------------------------------------------------------  IN: 240 mL / OUT: 5 mL / NET: 235 mL        PHYSICAL EXAM:  General exam: appear thin, tired. hard of hearing need to speak louder.   HEENT: eomi, perrl.   Neck: supple, trachea at midline  CVS : RRR, +S1/S2  Pulm: wound CDI, good air entry on left, reduced on right lower.   Abd:  BS present, soft, nt, not distended.  Skin: warm and dry,  Ext:  no edema, no tenderness  no almonte.  Neuro: AOx3, no gross focal neurological deficits  [place] :     BASIC LABS:                        12.9   8.17  )-----------( 171      ( 28 Jan 2023 07:16 )             39.5     01-28    141  |  103  |  9   ----------------------------<  125<H>  5.1   |  30  |  0.58    Ca    8.6      28 Jan 2023 07:16  Phos  3.2     01-28  Mg     2.0     01-28    TPro  7.4  /  Alb  4.3  /  TBili  0.9  /  DBili  x   /  AST  31  /  ALT  20  /  AlkPhos  44  01-28    PT/INR - ( 28 Jan 2023 07:16 )   PT: 12.5 sec;   INR: 1.05          PTT - ( 28 Jan 2023 07:16 )  PTT:29.3 sec    CAPILLARY BLOOD GLUCOSE          OTHER LABS:        MICRODATA:    Culture - Surgical Swab (collected 27 Jan 2023 16:00)  Source: .Surgical Swab right lower lung wedge or spec  Gram Stain (27 Jan 2023 19:05):    No organisms seen    Rare WBC's  Preliminary Report (28 Jan 2023 10:36):    No growth to date        IMAGING: chest x ray reviewed.    #Diet - Diet, Regular (01-28-23 @ 11:21) [Active]        #DVT PPx - enoxaparin Injectable: [Ordered as LOVENOX]  40 milliGRAM(s), SubCutaneous, every 24 hours  Administration Instructions: This is a High Alert Medication.  Provider's Contact #: [phone] (01-27-23 @ 17:01)

## 2023-01-28 NOTE — CHART NOTE - NSCHARTNOTEFT_GEN_A_CORE
Patient seen and examined. Diagnosis and progress discussed.    Chest tube to be removed today. Can discharge home after post pull x-ray if meeting criteria.     Heath Bejarano MD  Thoracic Surgery

## 2023-01-29 ENCOUNTER — TRANSCRIPTION ENCOUNTER (OUTPATIENT)
Age: 73
End: 2023-01-29

## 2023-01-29 VITALS — TEMPERATURE: 99 F

## 2023-01-29 LAB
ANION GAP SERPL CALC-SCNC: 8 MMOL/L — SIGNIFICANT CHANGE UP (ref 5–17)
BUN SERPL-MCNC: 11 MG/DL — SIGNIFICANT CHANGE UP (ref 7–23)
CALCIUM SERPL-MCNC: 8.5 MG/DL — SIGNIFICANT CHANGE UP (ref 8.4–10.5)
CHLORIDE SERPL-SCNC: 105 MMOL/L — SIGNIFICANT CHANGE UP (ref 96–108)
CO2 SERPL-SCNC: 27 MMOL/L — SIGNIFICANT CHANGE UP (ref 22–31)
CREAT SERPL-MCNC: 0.68 MG/DL — SIGNIFICANT CHANGE UP (ref 0.5–1.3)
EGFR: 93 ML/MIN/1.73M2 — SIGNIFICANT CHANGE UP
GLUCOSE SERPL-MCNC: 92 MG/DL — SIGNIFICANT CHANGE UP (ref 70–99)
HCT VFR BLD CALC: 36.7 % — SIGNIFICANT CHANGE UP (ref 34.5–45)
HGB BLD-MCNC: 11.9 G/DL — SIGNIFICANT CHANGE UP (ref 11.5–15.5)
MAGNESIUM SERPL-MCNC: 2.3 MG/DL — SIGNIFICANT CHANGE UP (ref 1.6–2.6)
MCHC RBC-ENTMCNC: 31.4 PG — SIGNIFICANT CHANGE UP (ref 27–34)
MCHC RBC-ENTMCNC: 32.4 GM/DL — SIGNIFICANT CHANGE UP (ref 32–36)
MCV RBC AUTO: 96.8 FL — SIGNIFICANT CHANGE UP (ref 80–100)
NRBC # BLD: 0 /100 WBCS — SIGNIFICANT CHANGE UP (ref 0–0)
PLATELET # BLD AUTO: 166 K/UL — SIGNIFICANT CHANGE UP (ref 150–400)
POTASSIUM SERPL-MCNC: 3.8 MMOL/L — SIGNIFICANT CHANGE UP (ref 3.5–5.3)
POTASSIUM SERPL-SCNC: 3.8 MMOL/L — SIGNIFICANT CHANGE UP (ref 3.5–5.3)
RBC # BLD: 3.79 M/UL — LOW (ref 3.8–5.2)
RBC # FLD: 13 % — SIGNIFICANT CHANGE UP (ref 10.3–14.5)
SODIUM SERPL-SCNC: 140 MMOL/L — SIGNIFICANT CHANGE UP (ref 135–145)
WBC # BLD: 5.93 K/UL — SIGNIFICANT CHANGE UP (ref 3.8–10.5)
WBC # FLD AUTO: 5.93 K/UL — SIGNIFICANT CHANGE UP (ref 3.8–10.5)

## 2023-01-29 PROCEDURE — 87075 CULTR BACTERIA EXCEPT BLOOD: CPT

## 2023-01-29 PROCEDURE — 88307 TISSUE EXAM BY PATHOLOGIST: CPT

## 2023-01-29 PROCEDURE — 87102 FUNGUS ISOLATION CULTURE: CPT

## 2023-01-29 PROCEDURE — C1889: CPT

## 2023-01-29 PROCEDURE — 71045 X-RAY EXAM CHEST 1 VIEW: CPT

## 2023-01-29 PROCEDURE — 84100 ASSAY OF PHOSPHORUS: CPT

## 2023-01-29 PROCEDURE — 80053 COMPREHEN METABOLIC PANEL: CPT

## 2023-01-29 PROCEDURE — 36415 COLL VENOUS BLD VENIPUNCTURE: CPT

## 2023-01-29 PROCEDURE — 88313 SPECIAL STAINS GROUP 2: CPT

## 2023-01-29 PROCEDURE — C9399: CPT

## 2023-01-29 PROCEDURE — 87070 CULTURE OTHR SPECIMN AEROBIC: CPT

## 2023-01-29 PROCEDURE — 85027 COMPLETE CBC AUTOMATED: CPT

## 2023-01-29 PROCEDURE — 88331 PATH CONSLTJ SURG 1 BLK 1SPC: CPT

## 2023-01-29 PROCEDURE — 71045 X-RAY EXAM CHEST 1 VIEW: CPT | Mod: 26

## 2023-01-29 PROCEDURE — 88305 TISSUE EXAM BY PATHOLOGIST: CPT

## 2023-01-29 PROCEDURE — 86850 RBC ANTIBODY SCREEN: CPT

## 2023-01-29 PROCEDURE — 85610 PROTHROMBIN TIME: CPT

## 2023-01-29 PROCEDURE — 80048 BASIC METABOLIC PNL TOTAL CA: CPT

## 2023-01-29 PROCEDURE — 86900 BLOOD TYPING SEROLOGIC ABO: CPT

## 2023-01-29 PROCEDURE — S2900: CPT

## 2023-01-29 PROCEDURE — 85730 THROMBOPLASTIN TIME PARTIAL: CPT

## 2023-01-29 PROCEDURE — 85025 COMPLETE CBC W/AUTO DIFF WBC: CPT

## 2023-01-29 PROCEDURE — 83735 ASSAY OF MAGNESIUM: CPT

## 2023-01-29 PROCEDURE — 86901 BLOOD TYPING SEROLOGIC RH(D): CPT

## 2023-01-29 PROCEDURE — 94640 AIRWAY INHALATION TREATMENT: CPT

## 2023-01-29 RX ORDER — POTASSIUM CHLORIDE 20 MEQ
20 PACKET (EA) ORAL ONCE
Refills: 0 | Status: COMPLETED | OUTPATIENT
Start: 2023-01-29 | End: 2023-01-29

## 2023-01-29 RX ORDER — IBUPROFEN 200 MG
1 TABLET ORAL
Qty: 0 | Refills: 0 | DISCHARGE
Start: 2023-01-29

## 2023-01-29 RX ORDER — ACETAMINOPHEN 500 MG
3 TABLET ORAL
Qty: 0 | Refills: 0 | DISCHARGE
Start: 2023-01-29

## 2023-01-29 RX ORDER — PANTOPRAZOLE SODIUM 20 MG/1
1 TABLET, DELAYED RELEASE ORAL
Qty: 30 | Refills: 0
Start: 2023-01-29 | End: 2023-02-27

## 2023-01-29 RX ORDER — OXYCODONE HYDROCHLORIDE 5 MG/1
1 TABLET ORAL
Qty: 20 | Refills: 0
Start: 2023-01-29 | End: 2023-02-02

## 2023-01-29 RX ADMIN — Medication 975 MILLIGRAM(S): at 11:11

## 2023-01-29 RX ADMIN — PANTOPRAZOLE SODIUM 40 MILLIGRAM(S): 20 TABLET, DELAYED RELEASE ORAL at 06:54

## 2023-01-29 RX ADMIN — Medication 20 MILLIEQUIVALENT(S): at 11:08

## 2023-01-29 RX ADMIN — Medication 975 MILLIGRAM(S): at 06:30

## 2023-01-29 RX ADMIN — Medication 975 MILLIGRAM(S): at 05:29

## 2023-01-29 RX ADMIN — AMLODIPINE BESYLATE 5 MILLIGRAM(S): 2.5 TABLET ORAL at 05:29

## 2023-01-29 RX ADMIN — Medication 975 MILLIGRAM(S): at 00:43

## 2023-01-29 NOTE — DISCHARGE NOTE NURSING/CASE MANAGEMENT/SOCIAL WORK - PATIENT PORTAL LINK FT
You can access the FollowMyHealth Patient Portal offered by [facility] by registering at the following website: [email or web address]. By joining [employer]’s FollowMyHealth portal, you will also be able to view your health information using other applications (apps) compatible with our system.

## 2023-01-29 NOTE — DISCHARGE NOTE PROVIDER - NSDCFUADDINST_GEN_ALL_CORE_FT
-You have one suture in place from your prior chest tube. It is important that you do not pull or remove the suture yourself, it will be removed at your follow up appointment. Please remove the dressing over the suture tomorrow 1/30.    -Walk daily as tolerated and use your incentive spirometer 10 times every hour while you are awake.     -No driving or strenuous activity/exercise until cleared by your surgeon.    -Gently clean your incisions with unscented/antibacterial soap and water, pat dry.  You may leave them open to air.    -Call your doctor if you have shortness of breath, chest pain not relieved by pain medication, dizziness, fever >100.5, or increased redness or drainage from incisions.

## 2023-01-29 NOTE — DISCHARGE NOTE NURSING/CASE MANAGEMENT/SOCIAL WORK - NSDCFUADDAPPT_GEN_ALL_CORE_FT
Regarding your follow up appointments, the office will be calling you with the scheduled dates and times. If you do not hear from the office by 1/31, please call the office at [phone].

## 2023-01-29 NOTE — DISCHARGE NOTE PROVIDER - CARE PROVIDER_API CALL
Ronaldo Narvaez)  Cardiovascular Disease; Internal Medicine; Interventional Cardiology  [address] [place] [address]  Phone: [phone]  Fax: [phone]  Follow Up Time: 1 week    Cherie Mi  Internal Medicine  [address] [place] [address], [address]  [place] [address]  Phone: ()-  Fax: ()-  Follow Up Time: 2 weeks

## 2023-01-29 NOTE — DISCHARGE NOTE NURSING/CASE MANAGEMENT/SOCIAL WORK - NSDCPEFALRISK_GEN_ALL_CORE
For information on Fall & Injury Prevention, visit: [email or web address] OR  [email or web address] OR  https://www.cdc.gov/steadi/patient.html

## 2023-01-29 NOTE — DISCHARGE NOTE PROVIDER - NSDCFUADDAPPT_GEN_ALL_CORE_FT
Regarding your follow up appointments, the office will be calling you with the scheduled dates and times. If you do not hear from the office by 1/31, please call the office at [phone].

## 2023-01-29 NOTE — DISCHARGE NOTE PROVIDER - HOSPITAL COURSE
72 year old F, Cantonese/ Mandarin speaking  2nd hand smoker, with PMHx of HTN, HLD, HepB on vemlidy, Osteoporosis, depression, pmd Cherie Israel refer patient to Dr. Ronaldo Narvaez for an initial evaluation of a 1.4cm sub-solid nodule in right lower lobe CT chest. Patient reports progressively worse shortness of breath since her COVID infection in Feb 2021. her  passed away last year, she went back to [place] and reportedly loss some weight, subsequent imaging shows increase in size of nodule and arranged for surgery. On 1/27 pt underwent a RVATs, RA, RLL wedge resection, MLND, with Dr. Narvaez. Pathology resulted as positive for malignancy and more margin was taken and she was subsequently admitted to [facility] with one chest tube in place. [facility] and then removed later in the day with post pull cxr stable no ptx or effusions noted. She is hesistant to go home today and requested to stay another night. On POD 2 she remained stable with no issues overnight. She was deemed medically stable for discharge by Dr. Higgins. She was ambulating without assistance, tolerated a PO diet and had a post operative BM. Neronote  ID: [id number] utilized. At time of discharge pt denies dizziness, vision changes, chest pain, palpitations, shortness of breath, cough, n/v/d, extremity swelling, calf tenderness. She has one tie down in place at prior chest tube site.     Patient discussed on morning rounds with Dr. Higgins    Operation Date: RVATs, RA, RLL wedge resection, MLND on 1/27    Primary Surgeon/Attending MD: Dr. Narvaez    Referring Physician: Dr. Hiwot Crespo  _ _ _ _ _ _ _ _ _ _ _ _  DISCHARGE PHYSICAL EXAM:  GEN: NAD, looks comfortable  Psych: Mood appropriate  Neuro: A&Ox3.  No focal deficits.  Moving all extremities.   HEENT: No obvious abnormalities  CV: S1S2, regular, no murmurs appreciated.  No carotid bruits.  No JVD  Lungs: Clear B/L.  No wheezing, rales or rhonchi  ABD: Soft, non-tender, non-distended.    EXT: Warm and well perfused.  No peripheral edema noted  Musculoskeletal: Moving all extremities with normal ROM, no joint swelling  Incisions: prior lap sites are clean dry and intact with no drainage or bleeding covered with steristrips. Prior chest tube site is clean dry and intact with tie down in place and occlusive dressing.   _ _ _ _ _ _ _ _ _ _ _ _  REMOVAL CHECKLIST:          [ ] Stitches/tie downs,   If no, why? prior chest tube site, to be removed at follow up appointment     _ _ _ _ _ _ _ _ _ _ _ _    RELEVANT LABS/IMAGING:  < from: Xray Chest 1 View- PORTABLE-Routine (Xray Chest 1 View- PORTABLE-Routine in AM.) (01.29.23 @ 05:44) >    IMPRESSION: Discoid change left lung base    --- End of Report ---    < end of copied text >    _ _ _ _ _ _ _ _ _ _ _ _  Over 35 minutes was spent with the patient reviewing the discharge material including medications, follow up appointments, recovery, concerning symptoms, and how to contact their health care providers if they have questions

## 2023-01-29 NOTE — DISCHARGE NOTE PROVIDER - NSDCMRMEDTOKEN_GEN_ALL_CORE_FT
atorvastatin 20 mg oral tablet: 1 tab(s) orally once a day  Norvasc 5 mg oral tablet: 1 tab(s) orally once a day  Prolia 60 mg/mL subcutaneous solution: 60 milligram(s) subcutaneous every 6 months  Vemlidy 25 mg oral tablet: 1 tab(s) orally once a day  zolpidem 10 mg oral tablet: 1 tab(s) orally once a day (at bedtime), As Needed   acetaminophen 325 mg oral tablet: 3 tab(s) orally every 6 hours  atorvastatin 20 mg oral tablet: 1 tab(s) orally once a day  ibuprofen 400 mg oral tablet: 1 tab(s) orally every 6 hours, As needed, Mild Pain (1 - 3)  Norvasc 5 mg oral tablet: 1 tab(s) orally once a day  Prolia 60 mg/mL subcutaneous solution: 60 milligram(s) subcutaneous every 6 months  Vemlidy 25 mg oral tablet: 1 tab(s) orally once a day  zolpidem 10 mg oral tablet: 1 tab(s) orally once a day (at bedtime), As Needed   acetaminophen 325 mg oral tablet: 3 tab(s) orally every 6 hours  atorvastatin 20 mg oral tablet: 1 tab(s) orally once a day  ibuprofen 400 mg oral tablet: 1 tab(s) orally every 6 hours, As needed, Mild Pain (1 - 3)  Norvasc 5 mg oral tablet: 1 tab(s) orally once a day  oxyCODONE 5 mg oral tablet: 1 tab(s) orally every 6 hours, As needed, Moderate Pain (4 - 6) MDD:4  pantoprazole 40 mg oral delayed release tablet: 1 tab(s) orally once a day (before a meal)  Prolia 60 mg/mL subcutaneous solution: 60 milligram(s) subcutaneous every 6 months  Vemlidy 25 mg oral tablet: 1 tab(s) orally once a day  zolpidem 10 mg oral tablet: 1 tab(s) orally once a day (at bedtime), As Needed

## 2023-01-29 NOTE — DISCHARGE NOTE PROVIDER - NSDCCPTREATMENT_GEN_ALL_CORE_FT
PRINCIPAL PROCEDURE  Procedure: Wedge resection of right lung with video-assisted thoracoscopic surgery (VATS)  Findings and Treatment: RVATS, RA, RLL wedge resection, MLND  You have undergone lung surgery, it is important that you attend your follow up appointments and take your prescribed medications.

## 2023-01-29 NOTE — DISCHARGE NOTE PROVIDER - PROVIDER TOKENS
PROVIDER:[TOKEN:[[id number]],FOLLOWUP:[1 week]],PROVIDER:[TOKEN:[[id number]],FOLLOWUP:[2 weeks]]

## 2023-01-29 NOTE — DISCHARGE NOTE PROVIDER - NSDCHHBASESERVICE_GEN_ALL_CORE
[f rep st]



                                                                    CONSULTATION





PULMONARY CRITICAL CARE CONSULTATION



DATE OF CONSULTATION:  03/05/2019



REASON FOR CONSULTATION:  Intensive care unit evaluation and management following prolonged downtime 
associated with a drug overdose and rhabdomyolysis.



HISTORY:  The patient is a 24-year-old with a history of psychiatric disease including ADHD, anxiety,
 and polysubstance abuse, who was brought in yesterday after being found unresponsive on his left lee
e at home.  Downtime was unknown.  However, it is suspected he was down for 24 hours or longer, possi
polo in 1 position.  He was breathing.  EMS was called.  [facility]  He stated he had taken heroin, Percocet and Xanax.  He may have told people that he had suicidal 
ideations.  His sister recently committed suicide.  He was tachycardic, with normal blood pressure on
 arrival.  White blood cell count was elevated at 15,000; sodium was 129 with a potassium of 5.7, BUN
 of 38 and a creatinine of 1.9.  Total CPK was greater than 30,000.  Tox screen was negative, includi
ng opiates.  Blood alcohol was less than 10.  He was found to have left upper extremity weakness.  Mu
ltiple studies were obtained including head CT and MRI, cervical spine MRI, chest x-ray, humerus x-ra
y.  Pertinent findings included bilateral punctate areas of ischemic injury in the cerebellum.  MRI o
f the surgical spine was negative.  The patient was admitted to the intensive care unit.  Intravenous
 fluids were given.  He was seen by Renal.  Renal ultrasound was normal.



PAST MEDICAL HISTORY:  As noted in the HPI:  ADHD, anxiety, chronic pain.



LISTED HOME MEDICATIONS:  Include:  Clomipramine, BuSpar, gabapentin, and Adderall.



SOCIAL HISTORY:  The patient has been in [place] for a number years.  He lives with roommates, works 
at a gas station.  Tobacco is denied.  Alcohol occasionally.  Drugs as above.



FAMILY HISTORY:  Noncontributory.



REVIEW OF SYSTEMS:  A 10-point review of systems is negative except as mentioned above.  He denies be
ing suicidal at this time.



PHYSICAL EXAMINATION:  GENERAL:  Reveals a gentleman is sleepy, arousable and responsive and appropri
ate.  VITAL SIGNS:  Blood pressure is 130/70, heart rate 100 with sinus tachycardia on the monitor, r
espiratory rate is 18.  He is on room air.  HEENT:  Unremarkable for lymphadenopathy or thyromegaly. 
 There is some mild neck tenderness, but there is good range of motion bilaterally in the neck.  Musc
les are somewhat tight.  Pupils appear equal.  There is no jugular venous distention.  There are no b
ruits appreciated.  CHEST:  Clear bilaterally.  HEART:  Regular in rate and rhythm.  There is soft sy
stolic murmur, no gallop.  ABDOMEN:  Soft, nontender.  Bowel sounds are present.  EXTREMITIES:  Remar
kable for some tenderness over the musculature of the left shoulder and left upper and lower arm, lef
t buttocks and less so left lower extremity.  NEUROLOGIC: Remarkable for weakness of the left upper e
xtremity regarding movement from the shoulder on down and including hand .  The left lower extrem
ity appears to be normal and equal to the right.



ASSESSMENT:  

1.  Found down.  Secondary to drug overdose with heroin and other substances.  Downtime was prolonged
, exact time unknown, probably 24 hours or greater.  This has resulted in rhabdomyolysis, volume depl
etion, and acute renal failure.

2.  Rhabdomyolysis.  CPKs are greater than 30,000.  He is receiving intravenous fluids.  Follow up CP
K is being checked.

3.  Acute renal failure.  Secondary to volume depletion and rhabdomyolysis.  He was receiving intrave
nous fluids; however, these were slowed secondary to a low sodium.  He is not being given bicarb curr
ently.

4.  Polysubstance overdose.

5.  Possible suicide ideation.  He denies this.  However, he is being kept on an M1 hold.

6.  Left upper extremity weakness.  I suspect this is secondary to a compression injury from his prol
onged downtime and is related only to the left upper extremity, not the punctate cerebellar ischemic 
findings.

7.  Cerebellar punctate infarcts.  Query etiology.  Vertebral artery dissection cannot be excluded.  
CTA cannot be done at this time secondary to elevated BUN and creatinine.  He is on aspirin.  He did 
receive heparin in the emergency department, but this has been discontinued.

8.  Metabolic:  Hyponatremia, hyperkalemia.



PLAN AND RECOMMENDATIONS:  The patient will be kept in the intensive care unit.  Intravenous fluids w
ill be continued.  Laboratory will be followed, repeated this afternoon.  CPKs will be followed along
 with renal function.  Neurologic status will be followed.  A CT angiogram of the vertebral arteries 
will be obtained after renal failure resolves. 



Further plans and recommendations will be made based on his progress over the next 12-24 hours.





Job #:  [id number]
Nursing

## 2023-01-30 PROBLEM — E78.5 HYPERLIPIDEMIA, UNSPECIFIED: Chronic | Status: ACTIVE | Noted: 2023-01-25

## 2023-01-30 PROBLEM — B19.10 UNSPECIFIED VIRAL HEPATITIS B WITHOUT HEPATIC COMA: Chronic | Status: ACTIVE | Noted: 2023-01-25

## 2023-01-30 PROBLEM — I10 ESSENTIAL (PRIMARY) HYPERTENSION: Chronic | Status: ACTIVE | Noted: 2023-01-25

## 2023-01-30 PROBLEM — M81.0 AGE-RELATED OSTEOPOROSIS WITHOUT CURRENT PATHOLOGICAL FRACTURE: Chronic | Status: ACTIVE | Noted: 2023-01-25

## 2023-01-30 PROBLEM — F41.8 OTHER SPECIFIED ANXIETY DISORDERS: Chronic | Status: ACTIVE | Noted: 2023-01-25

## 2023-01-30 PROBLEM — U07.1 COVID-19: Chronic | Status: ACTIVE | Noted: 2023-01-25

## 2023-01-30 PROBLEM — R91.1 SOLITARY PULMONARY NODULE: Chronic | Status: ACTIVE | Noted: 2023-01-26

## 2023-02-01 LAB
CULTURE RESULTS: NO GROWTH — SIGNIFICANT CHANGE UP
SPECIMEN SOURCE: SIGNIFICANT CHANGE UP

## 2023-02-06 LAB — SURGICAL PATHOLOGY STUDY: SIGNIFICANT CHANGE UP

## 2023-02-08 RX ORDER — OXYCODONE 5 MG/1
5 TABLET ORAL
Refills: 0 | Status: ACTIVE | COMMUNITY

## 2023-02-08 RX ORDER — IBUPROFEN 400 MG/1
400 TABLET ORAL
Refills: 0 | Status: ACTIVE | COMMUNITY

## 2023-02-10 ENCOUNTER — OUTPATIENT (OUTPATIENT)
Dept: OUTPATIENT SERVICES | Facility: HOSPITAL | Age: 73
LOS: 1 days | End: 2023-02-10
Payer: MEDICARE

## 2023-02-10 ENCOUNTER — APPOINTMENT (OUTPATIENT)
Dept: THORACIC SURGERY | Facility: CLINIC | Age: 73
End: 2023-02-10
Payer: MEDICARE

## 2023-02-10 DIAGNOSIS — H26.9 UNSPECIFIED CATARACT: Chronic | ICD-10-CM

## 2023-02-10 DIAGNOSIS — L23.9 ALLERGIC CONTACT DERMATITIS, UNSPECIFIED CAUSE: ICD-10-CM

## 2023-02-10 DIAGNOSIS — Z90.721 ACQUIRED ABSENCE OF OVARIES, UNILATERAL: Chronic | ICD-10-CM

## 2023-02-10 PROCEDURE — 71046 X-RAY EXAM CHEST 2 VIEWS: CPT

## 2023-02-10 PROCEDURE — 71046 X-RAY EXAM CHEST 2 VIEWS: CPT | Mod: 26

## 2023-02-10 PROCEDURE — 99024 POSTOP FOLLOW-UP VISIT: CPT

## 2023-02-10 RX ORDER — MOMETASONE FUROATE 1 MG/G
0.1 OINTMENT TOPICAL TWICE DAILY
Qty: 40 | Refills: 0 | Status: ACTIVE | COMMUNITY
Start: 2023-02-10 | End: 1900-01-01

## 2023-02-13 VITALS
BODY MASS INDEX: 20.91 KG/M2 | HEART RATE: 73 BPM | HEIGHT: 63 IN | SYSTOLIC BLOOD PRESSURE: 147 MMHG | OXYGEN SATURATION: 99 % | WEIGHT: 118 LBS | DIASTOLIC BLOOD PRESSURE: 77 MMHG | TEMPERATURE: 97.2 F | RESPIRATION RATE: 18 BRPM

## 2023-02-13 NOTE — DISCUSSION/SUMMARY
[Doing Well] : is doing well [Excellent Pain Control] : has excellent pain control [Remove Sutures/Staples] : removed sutures/staples [de-identified] : x1

## 2023-02-13 NOTE — ASSESSMENT
[FreeTextEntry1] : Patient is a 72 year old F, Cantonese speaking, with PMHx of HTN, HLD, HepB, Osteoporosis, depression, who was referred by Cherie Israel for a 1.4cm sub-solid nodule in right lower lobe. \par \par She is now two weeks s/p Right VATS, robotic assisted, Wedge resection of the right lower lobe lung nodule, MLND. She presents today for her 1st post-op visit. \par \par Surgical pathology on 01/27/23 was reviewed with the patient, pT1aN0, invasive acinar adenocarcinoma, all margins negative. Her preoperative PET scan showed no metastasis. I explained to the patient that she has stage I adenocarcinoma, no adjuvant treatment is needed per NCCN guideline. \par \par Her CXR completed today and was reviewed and without evidence of pneumothorax, pleural effusion or atelectasis. The patient is stable postoperative, Surgical infections are healing well without signs of infections., one suture was removed. She has allergic dermatitis to tape, will Rx topical steroids. She should return in 3 months with a CXR for stability. \par \par Plan: follow up in 3 months. \par \par Hermelinda EMERY FNP, am scribing for and the presence of Dr. GARY AKHTAR the following sections: history of present illness, past medical/family/surgical/family/social history, review of systems, vital signs, physical exam, and disposition.\par \par GARY EMERY, personally performed the services described in the documentation, reviewed the documentation recorded by the scribe in my presence and it accurately and completely records my words and actions.\par \par

## 2023-02-13 NOTE — COUNSELING
[No Heavy Lifting] : no heavy lifting (>15-20 lb. for 1 month or 25 lb. for 3 months from date of surgery) [S/S of infection] : signs and symptoms of infection (and to whom it should be reported) [Progressive Ambulation/Activity] : progressive ambulation/activity

## 2023-02-13 NOTE — REASON FOR VISIT
[de-identified] : Right VATS, robotic assisted, Wedge resection of the right lower lobe lung nodule, MLND [de-identified] : 01/27/23 [de-identified] : 2 [de-identified] : \par \par Surgical pathology: pT1aN0, invasive acinar adenocarcinoma, Total tumor size 1.0 cm, VPI not identified, LVI not identified, 0/7 lymph nodes; all margins negative.\par \par She presents today for her 1st post-op visit with CXR. Patient reports mild incisional pain, only needs acetaminophen PRN. She noticed pruritus rash, square shaped anterior chest wall, not associate to surgical wound. She otherwise denies fever, chills, persist cough, or shortness of breath. \par \par \par \par

## 2023-02-13 NOTE — PHYSICAL EXAM
[] : no respiratory distress [Auscultation Breath Sounds / Voice Sounds] : lungs were clear to auscultation bilaterally [Clean] : clean [Dry] : dry [Healing Well] : healing well

## 2023-02-15 NOTE — REVIEW OF SYSTEMS
[As Noted in HPI] : as noted in HPI [Depression] : depression [Negative] : Endocrine [FreeTextEntry4] : heard of hearing bilateral [de-identified] : insomnia

## 2023-02-15 NOTE — CONSULT LETTER
[FreeTextEntry2] : Cherie Israel [FreeTextEntry3] : Ronaldo Narvaez MD\par Professor, Cardiovascular & Thoracic Surgery\par [facility] [employer]\par Director of the [facility] \par Director of Thoracic Surgery, [facility]\par \par [facility]\par [address]\par [facility] 4th Floor\par [place] [address]\par Phone: [phone]\par Fax: [phone]\par \par \par

## 2023-02-15 NOTE — ASSESSMENT
[FreeTextEntry1] : IVELISSE MELGOZA is a 72 year old F, Cantonese speaking, 2nd hand smoker, with PMHx of HTN, HLD, HepB, Osteoporosis, depression, who is referred by Cherie Israel for an initial evaluation of a 1.4cm sub-solid nodule in right lower lobe CT chest. \par \par Patient reports progressively worse shortness of breath since her COVID infection in Feb 2021. She otherwise denies unintentional weight loss, fatigue, headache, anorexia, chest pain, or hemoptysis. \par \par CT chest on 01/04/23 and 07/18/2022 were reviewed and discussed with the patient. There's a 1.4 cm sub-solid nodule in Right lower lobe.  Adenocarcinoma spectrum lesions should be considered.  I suggest surgical resection of the nodule for diagnosis and treatment. \par \par The patient was counseled on the risks, benefits and alternatives of proceeding with RVATS, RA, RLL wedge resection, possible lobectomy and MLND . Specifically, the risk of bleeding, need for a blood transfusion, DVT, pulmonary embolism, pneumonia, postoperative respiratory insufficiency, postoperative ventilator support, as well as prolonged air leak, need for chest drainage, dysrhythmia, myocardial infarction, postoperative pain syndrome, need for adjuvant therapy, risk of recurrence, need for surveillance, conversion to an open procedure, as well as mortality was discussed with the patient who understands and agrees to the above.\par \par Will need to rule out metastatic disease with a  PET scan, and evaluate her pulmonary function with PFT. \par \par Plan:\par - RVATS, RA, RLL wedge resection, possible lobectomy and MLND on 01/27/23\par - PFT\par - PET/CT\par - PST in office today\par  - Medical and cardiac clearance. \par \par Hermelinda EMERY FNP, am scribing for and the presence of Dr. GARY AKHTAR the following sections: history of present illness, past medical/family/surgical/family/social history, review of systems, vital signs, physical exam, and disposition.\par \par GARY EMERY, personally performed the services described in the documentation, reviewed the documentation recorded by the scribe in my presence and it accurately and completely records my words and actions.\par \par

## 2023-02-15 NOTE — HISTORY OF PRESENT ILLNESS
[FreeTextEntry1] : IVELISSE MELGOZA is a 72 year old F, Cantonese speaking, 2nd hand smoker, with PMHx of HTN, HLD, HepB, Osteoporosis, depression, who is referred by Cherie Israel for an initial evaluation of a 1.4cm sub-solid nodule in right lower lobe CT chest. \par \par Patient reports progressively worse shortness of breath since her COVID infection in Feb 2021. She otherwise denies unintentional weight loss, fatigue, headache, anorexia, chest pain, or hemoptysis. \par \par CT chest on 01/04/23\par - Right lower lobe (14 mm) subsolid nodule.  Adenocarcinoma spectrum lesions should be considered.  \par - Right lower lobe 3 mm pleural-based nodule  Stable.\par - Right upper lobe 2 mm mucous plugging \par - Attended descending thoracic aorta (4.1 cm); unchanged.\par \par CT chest on 07/18/22\par - Mild biapical pleuroparenchymal scarring. \par -1.4 x 0.9 cm peripheral right lower lobe subsolid, largely ground-glass opacity. \par - 0.3 cm subpleural right lower lobe nodule  No pulmonary consolidation or mass.\par \par

## 2023-02-25 LAB
CULTURE RESULTS: SIGNIFICANT CHANGE UP
SPECIMEN SOURCE: SIGNIFICANT CHANGE UP

## 2023-05-12 ENCOUNTER — OUTPATIENT (OUTPATIENT)
Dept: OUTPATIENT SERVICES | Facility: HOSPITAL | Age: 73
LOS: 1 days | End: 2023-05-12
Payer: MEDICARE

## 2023-05-12 ENCOUNTER — APPOINTMENT (OUTPATIENT)
Dept: THORACIC SURGERY | Facility: CLINIC | Age: 73
End: 2023-05-12
Payer: MEDICARE

## 2023-05-12 VITALS
HEIGHT: 63 IN | BODY MASS INDEX: 19.49 KG/M2 | RESPIRATION RATE: 18 BRPM | TEMPERATURE: 97 F | WEIGHT: 110 LBS | OXYGEN SATURATION: 99 % | DIASTOLIC BLOOD PRESSURE: 73 MMHG | HEART RATE: 74 BPM | SYSTOLIC BLOOD PRESSURE: 130 MMHG

## 2023-05-12 DIAGNOSIS — Z09 ENCOUNTER FOR FOLLOW-UP EXAMINATION AFTER COMPLETED TREATMENT FOR CONDITIONS OTHER THAN MALIGNANT NEOPLASM: ICD-10-CM

## 2023-05-12 DIAGNOSIS — H26.9 UNSPECIFIED CATARACT: Chronic | ICD-10-CM

## 2023-05-12 DIAGNOSIS — Z90.721 ACQUIRED ABSENCE OF OVARIES, UNILATERAL: Chronic | ICD-10-CM

## 2023-05-12 DIAGNOSIS — Z08 ENCOUNTER FOR FOLLOW-UP EXAMINATION AFTER COMPLETED TREATMENT FOR MALIGNANT NEOPLASM: ICD-10-CM

## 2023-05-12 DIAGNOSIS — Z85.118 ENCOUNTER FOR FOLLOW-UP EXAMINATION AFTER COMPLETED TREATMENT FOR MALIGNANT NEOPLASM: ICD-10-CM

## 2023-05-12 DIAGNOSIS — C34.91 MALIGNANT NEOPLASM OF UNSPECIFIED PART OF RIGHT BRONCHUS OR LUNG: ICD-10-CM

## 2023-05-12 PROCEDURE — 71046 X-RAY EXAM CHEST 2 VIEWS: CPT

## 2023-05-12 PROCEDURE — 99214 OFFICE O/P EST MOD 30 MIN: CPT

## 2023-05-12 PROCEDURE — 71046 X-RAY EXAM CHEST 2 VIEWS: CPT | Mod: 26

## 2023-05-15 PROBLEM — C34.91 ADENOCARCINOMA OF RIGHT LUNG, STAGE 1: Status: ACTIVE | Noted: 2023-02-08

## 2023-05-15 PROBLEM — Z09 POSTOP CHECK: Status: ACTIVE | Noted: 2023-02-07

## 2023-05-15 NOTE — PHYSICAL EXAM
[Sclera] : the sclera and conjunctiva were normal [PERRL With Normal Accommodation] : pupils were equal in size, round, and reactive to light [Extraocular Movements] : extraocular movements were intact [Neck Appearance] : the appearance of the neck was normal [Neck Cervical Mass (___cm)] : no neck mass was observed [Respiration, Rhythm And Depth] : normal respiratory rhythm and effort [Exaggerated Use Of Accessory Muscles For Inspiration] : no accessory muscle use [Apical Impulse] : the apical impulse was normal [Heart Rate And Rhythm] : heart rate was normal and rhythm regular [Heart Sounds] : normal S1 and S2 [2+] : left 2+ [Bowel Sounds] : normal bowel sounds [Abdomen Soft] : soft [Abdomen Tenderness] : non-tender [No CVA Tenderness] : no ~M costovertebral angle tenderness [Abnormal Walk] : normal gait [Nail Clubbing] : no clubbing  or cyanosis of the fingernails [Musculoskeletal - Swelling] : no joint swelling seen [Skin Color & Pigmentation] : normal skin color and pigmentation [Skin Turgor] : normal skin turgor [] : no rash [Cranial Nerves] : cranial nerves 2-12 were intact [Deep Tendon Reflexes (DTR)] : deep tendon reflexes were 2+ and symmetric [Sensation] : the sensory exam was normal to light touch and pinprick [Oriented To Time, Place, And Person] : oriented to person, place, and time [Impaired Insight] : insight and judgment were intact [Affect] : the affect was normal

## 2023-06-06 NOTE — HISTORY OF PRESENT ILLNESS
[FreeTextEntry1] : 72-year-old F, Cantonese speaking, with PMHx of HTN, HLD, HepB, Osteoporosis, depression, who was referred by Cherie Israel for a 1.4cm sub-solid nodule in right lower lobe. \par \par On 01/27/23, she underwent right VATS, robotic assisted, Wedge resection of the right lower lobe lung nodule, MLND. \par \par Surgical Pathology: pT1aN0, invasive acinar adenocarcinoma, Total tumor size 1.0 cm, VPI not identified, LVI not identified, 0/7 lymph nodes; all margins negative. No adjuvant treatment is needed per NCCN guideline. \par \par She presents today for a follow up visit with CXR. (2nd post-op) \par \par She appears generally well, denies c/o, sob/jimenez, fever, lower extremity edema or palpitations. She reports occasional discomfort near incision sites.\par  \par \par

## 2023-06-06 NOTE — CONSULT LETTER
[Dear  ___] : Dear  [unfilled], [Consult Letter:] : I had the pleasure of evaluating your patient, [unfilled]. [Please see my note below.] : Please see my note below. [Consult Closing:] : Thank you very much for allowing me to participate in the care of this patient.  If you have any questions, please do not hesitate to contact me. [Sincerely,] : Sincerely, [FreeTextEntry3] : Ronaldo Narvaez MD\par Professor, Cardiovascular & Thoracic Surgery\par [facility] [employer]\par Director of the [facility] \par Director of Thoracic Surgery, [facility]\par \par [facility]\par [address]\par [facility] 4th Floor\par [place] [address]\par Phone: [phone]\par Fax: [phone]\par

## 2023-06-06 NOTE — ASSESSMENT
[FreeTextEntry1] : 72-year-old F, Cantonese speaking, with PMHx of HTN, HLD, HepB, Osteoporosis, depression, who was referred by Cherie Israel for a 1.4cm sub-solid nodule in right lower lobe. \par \par On 01/27/23, she underwent right VATS, robotic assisted, Wedge resection of the right lower lobe lung nodule, MLND. \par \par Surgical Pathology: pT1aN0, invasive acinar adenocarcinoma, Total tumor size 1.0 cm, VPI not identified, LVI not identified, 0/7 lymph nodes; all margins negative. No adjuvant treatment is needed per NCCN guideline. \par \par Chest X ray reviewed today and demonstrates clear lung fields. There is no effusion, infiltrate, or pneumothorax. \par \par Plan: \par RTC in 3 months with a noncontrast CT chest

## 2023-06-06 NOTE — END OF VISIT
[Time Spent: ___ minutes] : I have spent [unfilled] minutes of time on the encounter. [FreeTextEntry3] : I, SOPHIA BELLO , am scribing for and in the presence of GARY AKHTAR the following sections: History of present illness, past Medical/family/surgical/family/social history, review of systems, vital signs, physical exam and disposition.\par

## 2023-07-28 PROBLEM — Z08 ENCOUNTER FOR FOLLOW-UP SURVEILLANCE OF LUNG CANCER: Status: ACTIVE | Noted: 2023-07-28

## 2023-08-11 ENCOUNTER — APPOINTMENT (OUTPATIENT)
Dept: THORACIC SURGERY | Facility: CLINIC | Age: 73
End: 2023-08-11

## 2023-08-30 ENCOUNTER — NON-APPOINTMENT (OUTPATIENT)
Age: 73
End: 2023-08-30

## 2024-04-10 ENCOUNTER — APPOINTMENT (OUTPATIENT)
Dept: THORACIC SURGERY | Facility: CLINIC | Age: 74
End: 2024-04-10

## 2024-04-18 NOTE — DISCHARGE NOTE PROVIDER - ATTENDING ATTESTATION STATEMENT
What Type Of Note Output Would You Prefer (Optional)?: Bullet Format How Severe Is Your Skin Lesion?: moderate Has Your Skin Lesion Been Treated?: not been treated Is This A New Presentation, Or A Follow-Up?: Skin Lesion I have personally seen and examined the patient. I have collaborated with and supervised the

## 2024-08-22 ENCOUNTER — NON-APPOINTMENT (OUTPATIENT)
Age: 74
End: 2024-08-22

## 2024-08-22 NOTE — HISTORY OF PRESENT ILLNESS
[FreeTextEntry1] : 73-year-old F, Cantonese speaking, with PMHx of HTN, HLD, HepB, Osteoporosis, depression, Hard of Hearing, who was referred by Cherie Israel for a 1.4cm sub-solid nodule in right lower lobe.  On 01/27/23, she underwent right VATS, robotic assisted, Wedge resection of the right lower lobe lung nodule, MLND for Stage IA1 pT1aN0, invasive acinar adenocarcinoma, all margins negative. No adjuvant treatment is needed per NCCN guideline.  Patient presents today with a Surveillance CT chest. Reports doing well in general with intermittent dyspnea on exertion. Denies unintentional weight loss, headache, anorexia, fatigue, persist cough, chest pain, or hemoptysis.  CT chest on 08/20/2024 - Right lower lobe wedge resection. - Stable subcentimeter pulmonary nodules measuring up to 2 mm, dating back to 07/2022.   -- 2 mm nodule in the superior segment the right lower lobe (series 2, image 41)   -- 2 mm nodule in the right middle lobe (image 51)   -- 2 mm nodule along the right major fissure (image 73)   -- 2 mm subpleural nodule in the anterior left upper lobe (image 23) - Stable ectatic ascending thoracic aorta (4.1 cm).

## 2024-08-22 NOTE — REASON FOR VISIT
[Follow-Up: _____] : a [unfilled] follow-up visit [Family Member] : family member [FreeTextEntry1] : CT chest

## 2024-08-22 NOTE — ASSESSMENT
[FreeTextEntry1] : 73-year-old F, Cantonese speaking, with PMHx of HTN, HLD, HepB, Osteoporosis, depression, [place], who was referred by Cherie Israel for a 1.4cm sub-solid nodule in right lower lobe.  On 01/27/23, she underwent wedge resection of the right lower lobe lung nodule for a Stage IA1 invasive acinar adenocarcinoma. No adjuvant treatment is needed per NCCN guideline.    CT chest completed on 08/20/2024 was reviewed with pt's sister: stable subcentimeter pulmonary nodules measuring up to 2 mm, dating back to 07/2022. No evidence of recurrent disease.  Patient is stable with intermittent SOB on exertion.  I encouraged her to see PCP for a workup, engage in daily exercise (at least 30mis/day) to improve her cardiopulmonary function. We will follow up her in 6months with a CT chest for stability.   Patient stated understanding and agreed to the plan.   Plan: CT chest in 6 months.

## 2024-08-23 ENCOUNTER — APPOINTMENT (OUTPATIENT)
Dept: THORACIC SURGERY | Facility: CLINIC | Age: 74
End: 2024-08-23
Payer: MEDICARE

## 2024-08-23 DIAGNOSIS — Z85.118 ENCOUNTER FOR FOLLOW-UP EXAMINATION AFTER COMPLETED TREATMENT FOR MALIGNANT NEOPLASM: ICD-10-CM

## 2024-08-23 DIAGNOSIS — Z08 ENCOUNTER FOR FOLLOW-UP EXAMINATION AFTER COMPLETED TREATMENT FOR MALIGNANT NEOPLASM: ICD-10-CM

## 2024-08-23 PROCEDURE — 99442: CPT

## 2024-08-24 NOTE — REASON FOR VISIT
[Home] : at home, [unfilled] , at the time of the visit. [Medical Office: ([facility])___] : at the medical office located in  [Verbal consent obtained from patient] : the patient, [unfilled] [FreeTextEntry1] : CT chest

## 2024-08-24 NOTE — ASSESSMENT
[FreeTextEntry1] : 73-year-old F, Cantonese speaking, with PMHx of HTN, HLD, HepB, Osteoporosis, depression, [place], who was referred by Cherie Israel for a 1.4cm sub-solid nodule in right lower lobe.  On 01/27/23, she underwent wedge resection of the right lower lobe lung nodule for a Stage IA1 invasive acinar adenocarcinoma. No adjuvant treatment is needed per NCCN guideline.    CT chest completed on 08/20/2024 was reviewed with pt's sister: stable subcentimeter pulmonary nodules measuring up to 2 mm, dating back to 07/2022. No evidence of recurrent disease.  Patient is stable with intermittent SOB on exertion.  I encouraged her to see PCP for a workup, engage in daily exercise (at least 30mis/day) to improve her cardiopulmonary function. We will follow up her in 6months with a CT chest for stability.   Patient stated understanding and agreed to the plan.   Plan: CT chest in 6 months.

## 2025-03-04 ENCOUNTER — NON-APPOINTMENT (OUTPATIENT)
Age: 75
End: 2025-03-04

## 2025-03-04 ENCOUNTER — APPOINTMENT (OUTPATIENT)
Dept: THORACIC SURGERY | Facility: CLINIC | Age: 75
End: 2025-03-04
Payer: MEDICARE

## 2025-03-04 DIAGNOSIS — R91.1 SOLITARY PULMONARY NODULE: ICD-10-CM

## 2025-03-04 DIAGNOSIS — Z85.118 ENCOUNTER FOR FOLLOW-UP EXAMINATION AFTER COMPLETED TREATMENT FOR MALIGNANT NEOPLASM: ICD-10-CM

## 2025-03-04 DIAGNOSIS — Z08 ENCOUNTER FOR FOLLOW-UP EXAMINATION AFTER COMPLETED TREATMENT FOR MALIGNANT NEOPLASM: ICD-10-CM

## 2025-03-04 PROCEDURE — 99212 OFFICE O/P EST SF 10 MIN: CPT

## (undated) DEVICE — Device

## (undated) DEVICE — TROCAR ETHICON ENDOPATH XCEL BLADELESS 5MM X 100MM STABILITY

## (undated) DEVICE — PACK ROBOTIC

## (undated) DEVICE — TROCAR ETHICON ENDOPATH XCEL BLADELESS 12MM X 100MM SMOOTH

## (undated) DEVICE — VENODYNE/SCD SLEEVE CALF MEDIUM

## (undated) DEVICE — CHEST DRAIN PLEUR-EVAC DRY/WET ADULT-PEDS SINGLE (QUICK)

## (undated) DEVICE — ENDOCATCH GENERAL 15MM (PURPLE)

## (undated) DEVICE — XI ARM FORCEP FENESTRATED BIPOLAR 8MM

## (undated) DEVICE — WARMING BLANKET LOWER ADULT

## (undated) DEVICE — TUBING STRYKER PNEUMOCLEAR HEAT HUMID

## (undated) DEVICE — SUT PROLENE 0 30" CT-1

## (undated) DEVICE — ENDOCATCH GENERAL 10MM (PURPLE)

## (undated) DEVICE — XI DRAPE COLUMN

## (undated) DEVICE — ELCTR BOVIE TIP SPATULA MEGADYNE E-Z CLEAN LAPAROSCOPIC 13.5" STANDARD

## (undated) DEVICE — DRSG GAUZE PACKTNER ROLL

## (undated) DEVICE — XI DRAPE ARM

## (undated) DEVICE — XI OBTURATOR OPTICAL BLADELESS 8MM

## (undated) DEVICE — GLV 7.5 PROTEXIS (WHITE)

## (undated) DEVICE — ELCTR GROUNDING PAD ADULT COVIDIEN

## (undated) DEVICE — TAPE SILK 3"

## (undated) DEVICE — XI SEAL UNIV 5- 8 MM

## (undated) DEVICE — XI VESSEL SEALER

## (undated) DEVICE — ELCTR BOVIE TIP BLADE VALLEYLAB 6.5"

## (undated) DEVICE — D HELP - CLEARVIEW CLEARIFY SYSTEM

## (undated) DEVICE — TROCAR ETHICON ENDOPATH XCEL BLADELESS 15MM X 100MM STABILITY

## (undated) DEVICE — STAPLER COVIDIEN ENDO GIA STANDARD HANDLE

## (undated) DEVICE — DVC ASCOPE 4 SNGL USE SLIM